# Patient Record
Sex: MALE | Race: OTHER | HISPANIC OR LATINO | Employment: UNEMPLOYED | URBAN - METROPOLITAN AREA
[De-identification: names, ages, dates, MRNs, and addresses within clinical notes are randomized per-mention and may not be internally consistent; named-entity substitution may affect disease eponyms.]

---

## 2017-02-26 ENCOUNTER — HOSPITAL ENCOUNTER (EMERGENCY)
Facility: HOSPITAL | Age: 22
Discharge: HOME | End: 2017-02-27
Attending: EMERGENCY MEDICINE | Admitting: EMERGENCY MEDICINE
Payer: COMMERCIAL

## 2017-02-26 VITALS
HEART RATE: 63 BPM | RESPIRATION RATE: 16 BRPM | WEIGHT: 132.5 LBS | OXYGEN SATURATION: 98 % | DIASTOLIC BLOOD PRESSURE: 69 MMHG | TEMPERATURE: 98.2 F | SYSTOLIC BLOOD PRESSURE: 108 MMHG

## 2017-02-26 DIAGNOSIS — R51.9 HEADACHE: Primary | ICD-10-CM

## 2017-02-26 DIAGNOSIS — R11.10 VOMITING: ICD-10-CM

## 2017-02-27 PROCEDURE — 99283 EMERGENCY DEPT VISIT LOW MDM: CPT

## 2017-02-27 RX ORDER — METOCLOPRAMIDE 10 MG/1
10 TABLET ORAL ONCE
Status: COMPLETED | OUTPATIENT
Start: 2017-02-27 | End: 2017-02-27

## 2017-02-27 RX ORDER — IBUPROFEN 600 MG/1
600 TABLET ORAL ONCE
Status: COMPLETED | OUTPATIENT
Start: 2017-02-27 | End: 2017-02-27

## 2017-02-27 RX ADMIN — METOCLOPRAMIDE 10 MG: 10 TABLET ORAL at 00:58

## 2017-02-27 RX ADMIN — IBUPROFEN 600 MG: 600 TABLET, FILM COATED ORAL at 00:58

## 2017-03-22 ENCOUNTER — HOSPITAL ENCOUNTER (EMERGENCY)
Facility: HOSPITAL | Age: 22
Discharge: HOME | End: 2017-03-22
Attending: EMERGENCY MEDICINE | Admitting: EMERGENCY MEDICINE

## 2017-03-22 VITALS
RESPIRATION RATE: 17 BRPM | SYSTOLIC BLOOD PRESSURE: 101 MMHG | TEMPERATURE: 98.6 F | HEART RATE: 58 BPM | OXYGEN SATURATION: 100 % | DIASTOLIC BLOOD PRESSURE: 63 MMHG | WEIGHT: 132 LBS

## 2017-03-22 DIAGNOSIS — T14.8XXA MUSCLE STRAIN: ICD-10-CM

## 2017-03-22 DIAGNOSIS — M54.50 LOW BACK PAIN: Primary | ICD-10-CM

## 2017-03-22 LAB
BILIRUB UR QL STRIP: NEGATIVE
CLARITY UR: CLEAR
COLOR UR: YELLOW
COLOR, POC: YELLOW
GLUCOSE UR STRIP-MCNC: NEGATIVE MG/DL
HGB UR QL STRIP.AUTO: NEGATIVE
KETONES UR STRIP-MCNC: NEGATIVE MG/DL
LEUKOCYTE ESTERASE UR QL STRIP: NEGATIVE
NITRITE UR QL STRIP: NEGATIVE
PH UR STRIP.AUTO: 6 [PH] (ref 4.5–8)
PROT UR STRIP-MCNC: NEGATIVE MG/DL
SP GR UR STRIP.AUTO: 1.02 (ref 1–1.03)
UROBILINOGEN UR QL STRIP.AUTO: 0.2 E.U./DL

## 2017-03-22 PROCEDURE — 81003 URINALYSIS AUTO W/O SCOPE: CPT

## 2017-03-22 PROCEDURE — 99284 EMERGENCY DEPT VISIT MOD MDM: CPT

## 2017-03-22 PROCEDURE — 81002 URINALYSIS NONAUTO W/O SCOPE: CPT

## 2017-03-22 PROCEDURE — 96372 THER/PROPH/DIAG INJ SC/IM: CPT

## 2017-03-22 RX ORDER — KETOROLAC TROMETHAMINE 30 MG/ML
15 INJECTION, SOLUTION INTRAMUSCULAR; INTRAVENOUS ONCE
Status: COMPLETED | OUTPATIENT
Start: 2017-03-22 | End: 2017-03-22

## 2017-03-22 RX ORDER — NAPROXEN 375 MG/1
375 TABLET ORAL
Qty: 30 TABLET | Refills: 0 | Status: SHIPPED | OUTPATIENT
Start: 2017-03-22 | End: 2019-11-06

## 2017-03-22 RX ADMIN — KETOROLAC TROMETHAMINE 15 MG: 30 INJECTION, SOLUTION INTRAMUSCULAR at 21:51

## 2019-11-06 ENCOUNTER — HOSPITAL ENCOUNTER (INPATIENT)
Facility: HOSPITAL | Age: 24
LOS: 6 days | Discharge: HOME | DRG: 751 | End: 2019-11-13
Attending: PSYCHIATRY & NEUROLOGY | Admitting: PSYCHIATRY & NEUROLOGY
Payer: COMMERCIAL

## 2019-11-06 DIAGNOSIS — F33.2 SEVERE EPISODE OF RECURRENT MAJOR DEPRESSIVE DISORDER, WITHOUT PSYCHOTIC FEATURES (HCC): Primary | Chronic | ICD-10-CM

## 2019-11-06 DIAGNOSIS — F19.10 SUBSTANCE ABUSE (HCC): ICD-10-CM

## 2019-11-06 DIAGNOSIS — R45.851 SUICIDAL IDEATION: ICD-10-CM

## 2019-11-06 DIAGNOSIS — F39 MOOD DISORDER (HCC): ICD-10-CM

## 2019-11-06 DIAGNOSIS — R74.8 ELEVATED LIVER ENZYMES: ICD-10-CM

## 2019-11-06 DIAGNOSIS — F11.20 OPIOID DEPENDENCE ON AGONIST THERAPY (HCC): ICD-10-CM

## 2019-11-06 DIAGNOSIS — F17.200 NICOTINE DEPENDENCE: ICD-10-CM

## 2019-11-06 LAB
ALBUMIN SERPL BCP-MCNC: 4.5 G/DL (ref 3–5.2)
ALP SERPL-CCNC: 45 U/L (ref 43–122)
ALT SERPL W P-5'-P-CCNC: 351 U/L (ref 9–52)
AMPHETAMINES SERPL QL SCN: NEGATIVE
ANION GAP SERPL CALCULATED.3IONS-SCNC: 6 MMOL/L (ref 5–14)
APAP SERPL-MCNC: <10 UG/ML (ref 10–20)
AST SERPL W P-5'-P-CCNC: 120 U/L (ref 17–59)
ATRIAL RATE: 68 BPM
BARBITURATES UR QL: NEGATIVE
BASOPHILS # BLD AUTO: 0.1 THOUSANDS/ΜL (ref 0–0.1)
BASOPHILS NFR BLD AUTO: 1 % (ref 0–1)
BENZODIAZ UR QL: NEGATIVE
BILIRUB SERPL-MCNC: 0.8 MG/DL
BUN SERPL-MCNC: 12 MG/DL (ref 5–25)
CALCIUM SERPL-MCNC: 9.5 MG/DL (ref 8.4–10.2)
CHLORIDE SERPL-SCNC: 102 MMOL/L (ref 97–108)
CO2 SERPL-SCNC: 33 MMOL/L (ref 22–30)
COCAINE UR QL: NEGATIVE
CREAT SERPL-MCNC: 0.91 MG/DL (ref 0.7–1.5)
EOSINOPHIL # BLD AUTO: 0.1 THOUSAND/ΜL (ref 0–0.4)
EOSINOPHIL NFR BLD AUTO: 1 % (ref 0–6)
ERYTHROCYTE [DISTWIDTH] IN BLOOD BY AUTOMATED COUNT: 13.8 %
ETHANOL EXG-MCNC: 0 MG/DL
GFR SERPL CREATININE-BSD FRML MDRD: 118 ML/MIN/1.73SQ M
GLUCOSE SERPL-MCNC: 94 MG/DL (ref 70–99)
HCT VFR BLD AUTO: 40.7 % (ref 41–53)
HGB BLD-MCNC: 13.9 G/DL (ref 13.5–17.5)
LYMPHOCYTES # BLD AUTO: 1.8 THOUSANDS/ΜL (ref 0.5–4)
LYMPHOCYTES NFR BLD AUTO: 16 % (ref 25–45)
MCH RBC QN AUTO: 28.4 PG (ref 26–34)
MCHC RBC AUTO-ENTMCNC: 34.2 G/DL (ref 31–36)
MCV RBC AUTO: 83 FL (ref 80–100)
METHADONE UR QL: NEGATIVE
MONOCYTES # BLD AUTO: 0.8 THOUSAND/ΜL (ref 0.2–0.9)
MONOCYTES NFR BLD AUTO: 7 % (ref 1–10)
NEUTROPHILS # BLD AUTO: 8.8 THOUSANDS/ΜL (ref 1.8–7.8)
NEUTS SEG NFR BLD AUTO: 76 % (ref 45–65)
OPIATES UR QL SCN: POSITIVE
P AXIS: 71 DEGREES
PCP UR QL: NEGATIVE
PLATELET # BLD AUTO: 350 THOUSANDS/UL (ref 150–450)
PMV BLD AUTO: 7.4 FL (ref 8.9–12.7)
POTASSIUM SERPL-SCNC: 4.2 MMOL/L (ref 3.6–5)
PR INTERVAL: 166 MS
PROT SERPL-MCNC: 7.5 G/DL (ref 5.9–8.4)
QRS AXIS: 48 DEGREES
QRSD INTERVAL: 104 MS
QT INTERVAL: 400 MS
QTC INTERVAL: 425 MS
RBC # BLD AUTO: 4.92 MILLION/UL (ref 4.5–5.9)
SALICYLATES SERPL-MCNC: <1 MG/DL (ref 10–30)
SODIUM SERPL-SCNC: 141 MMOL/L (ref 137–147)
T WAVE AXIS: 48 DEGREES
THC UR QL: NEGATIVE
VENTRICULAR RATE: 68 BPM
WBC # BLD AUTO: 11.6 THOUSAND/UL (ref 4.5–11)

## 2019-11-06 PROCEDURE — 93010 ELECTROCARDIOGRAM REPORT: CPT | Performed by: INTERNAL MEDICINE

## 2019-11-06 PROCEDURE — 93005 ELECTROCARDIOGRAM TRACING: CPT

## 2019-11-06 PROCEDURE — 86592 SYPHILIS TEST NON-TREP QUAL: CPT | Performed by: PSYCHIATRY & NEUROLOGY

## 2019-11-06 PROCEDURE — 82075 ASSAY OF BREATH ETHANOL: CPT | Performed by: EMERGENCY MEDICINE

## 2019-11-06 PROCEDURE — 83735 ASSAY OF MAGNESIUM: CPT | Performed by: PSYCHIATRY & NEUROLOGY

## 2019-11-06 PROCEDURE — 99285 EMERGENCY DEPT VISIT HI MDM: CPT | Performed by: EMERGENCY MEDICINE

## 2019-11-06 PROCEDURE — 85025 COMPLETE CBC W/AUTO DIFF WBC: CPT | Performed by: EMERGENCY MEDICINE

## 2019-11-06 PROCEDURE — 80053 COMPREHEN METABOLIC PANEL: CPT | Performed by: EMERGENCY MEDICINE

## 2019-11-06 PROCEDURE — 36415 COLL VENOUS BLD VENIPUNCTURE: CPT | Performed by: EMERGENCY MEDICINE

## 2019-11-06 PROCEDURE — 84443 ASSAY THYROID STIM HORMONE: CPT | Performed by: PSYCHIATRY & NEUROLOGY

## 2019-11-06 PROCEDURE — 80061 LIPID PANEL: CPT | Performed by: PSYCHIATRY & NEUROLOGY

## 2019-11-06 PROCEDURE — 80329 ANALGESICS NON-OPIOID 1 OR 2: CPT | Performed by: EMERGENCY MEDICINE

## 2019-11-06 PROCEDURE — 80307 DRUG TEST PRSMV CHEM ANLYZR: CPT | Performed by: EMERGENCY MEDICINE

## 2019-11-06 PROCEDURE — G0480 DRUG TEST DEF 1-7 CLASSES: HCPCS | Performed by: EMERGENCY MEDICINE

## 2019-11-06 PROCEDURE — 99285 EMERGENCY DEPT VISIT HI MDM: CPT

## 2019-11-06 NOTE — ED NOTES
Insurance Authorization   EVS (Eligibility Verification System) called - [phone]    Automated system indicates: **    Patient is not eligible for Medical Assistance

## 2019-11-06 NOTE — ED NOTES
Patient is a 22 yr old male brought to the ED by his aunt after they went to [facility] asking for treatment  This AM, his aunt went to check on him and found him with a kitchen knife trying to cut his wrist  He has no prior hx of trying to hurt self, but said that he has been hearing voices for the last week  He also has a hx of drug abuse, reports that he  has been using heroin daily, 10 bags IV, for the last year  Three months ago, he was arrested for a DUI, said that he was using ETOH and percocets at the time  THe DUI is not completed, and he did not go for treatment at that time  He is agreeable to dual treatment  He believes that his drug use got worse after the mother of his son kicked him out  He is depressed, withdrawn, but cooperative  He reports that he tried to hurt himself due to the voices telling him  He tried to stop the Heroin last night, but got sick and used today around noon  He is willing to get treatment and signed a 12 ([place])       Patient was referred to the hospital by [facility] but is not a regular client there    He has no hx of rehabs or inpatient treatment      Donnie MONGE

## 2019-11-06 NOTE — ED PROVIDER NOTES
History  Chief Complaint   Patient presents with    Psychiatric Evaluation     Aunt reports when she walked into the house he was laying with a knife laying on his wrist  Patient reports he is tired of using of heroin, snorts 10 bags a day  He wants to get help to get off of the drugs  +SI/ -HI  +AH "The voices tell me to kill myself"  21 yo M with PMH of heroin abuse presents with suicidal ideation  He is sick of using drugs and wants to be done with them, so has been thinking of hurting himself  His aunt came home to find him with a knife to his wrist, but he denies cutting himself  Last used heroin 4 hours PTA, denies any other ingestions  No complaints at this time  Hears voices tellng him to kill himself  No HI  No previous in past        History provided by:  Patient and relative   used: No    Suicidal   Presenting symptoms: hallucinations and suicidal thoughts    Patient accompanied by:  Family member  Onset quality:  Gradual  Timing:  Constant  Progression:  Unchanged  Chronicity:  New  Context: drug abuse    Associated symptoms: no abdominal pain, no anxiety, no chest pain, no fatigue and no headaches        None       Past Medical History:   Diagnosis Date    No known problems        Past Surgical History:   Procedure Laterality Date    NO PAST SURGERIES         History reviewed  No pertinent family history  I have reviewed and agree with the history as documented  Social History     Tobacco Use    Smoking status: Never Smoker    Smokeless tobacco: Current User   Substance Use Topics    Alcohol use: No    Drug use: Yes     Types: Heroin        Review of Systems   Constitutional: Negative for chills, diaphoresis, fatigue, fever and unexpected weight change  HENT: Negative for congestion, ear pain, rhinorrhea, sore throat, trouble swallowing and voice change  Eyes: Negative for pain and visual disturbance     Respiratory: Negative for cough, chest tightness and shortness of breath  Cardiovascular: Negative for chest pain, palpitations and leg swelling  Gastrointestinal: Negative for abdominal pain, blood in stool, constipation, diarrhea, nausea and vomiting  Genitourinary: Negative for difficulty urinating and hematuria  Musculoskeletal: Negative for arthralgias, back pain and neck pain  Skin: Negative for rash  Neurological: Negative for dizziness, syncope, light-headedness and headaches  Psychiatric/Behavioral: Positive for hallucinations and suicidal ideas  Negative for confusion  The patient is not nervous/anxious  Physical Exam  Physical Exam   Constitutional: He is oriented to person, place, and time  He appears well-developed and well-nourished  No distress  HENT:   Head: Normocephalic and atraumatic  Right Ear: External ear normal    Left Ear: External ear normal    Nose: Nose normal    Mouth/Throat: Oropharynx is clear and moist    Eyes: Pupils are equal, round, and reactive to light  Conjunctivae and EOM are normal  Right eye exhibits no discharge  Left eye exhibits no discharge  No scleral icterus  Neck: Normal range of motion  Neck supple  No JVD present  No tracheal deviation present  Cardiovascular: Normal rate, regular rhythm, normal heart sounds and intact distal pulses  Exam reveals no gallop and no friction rub  No murmur heard  Pulmonary/Chest: Effort normal and breath sounds normal  No stridor  No respiratory distress  He has no wheezes  He has no rales  He exhibits no tenderness  Abdominal: Soft  Bowel sounds are normal  He exhibits no distension  There is no tenderness  There is no rebound and no guarding  Musculoskeletal: Normal range of motion  He exhibits no edema, tenderness or deformity  Lymphadenopathy:     He has no cervical adenopathy  Neurological: He is alert and oriented to person, place, and time  No cranial nerve deficit or sensory deficit  Coordination normal    Skin: Skin is warm and dry   No rash noted  He is not diaphoretic  Psychiatric: He has a normal mood and affect  His speech is normal  Judgment normal  He is withdrawn  Cognition and memory are normal  He expresses suicidal ideation  He expresses suicidal plans  He expresses no homicidal plans  Nursing note and vitals reviewed  Vital Signs  ED Triage Vitals   Temperature Pulse Respirations Blood Pressure SpO2   11/06/19 1500 11/06/19 1500 11/06/19 1500 11/06/19 1500 11/06/19 1500   (!) 96 °F (35 6 °C) 80 16 101/61 97 %      Temp Source Heart Rate Source Patient Position - Orthostatic VS BP Location FiO2 (%)   11/06/19 1500 11/06/19 1736 11/06/19 1500 11/06/19 1500 --   Tympanic Monitor Sitting Left arm       Pain Score       --                  Vitals:    11/06/19 1500 11/06/19 1736   BP: 101/61 97/56   Pulse: 80 58   Patient Position - Orthostatic VS: Sitting Lying         Visual Acuity      ED Medications  Medications - No data to display    Diagnostic Studies  Results Reviewed     Procedure Component Value Units Date/Time    Rapid drug screen, urine [[id number]]  (Abnormal) Collected:  11/06/19 1519    Lab Status:  Final result Specimen:  Urine, Other Updated:  11/06/19 1555     Amph/Meth UR Negative     Barbiturate Ur Negative     Benzodiazepine Urine Negative     Cocaine Urine Negative     Methadone Urine Negative     Opiate Urine Positive     PCP Ur Negative     THC Urine Negative    Narrative:       Presumptive report  If requested, specimen will be sent to reference lab for confirmation  FOR MEDICAL PURPOSES ONLY  IF CONFIRMATION NEEDED PLEASE CONTACT THE LAB WITHIN 5 DAYS      Drug Screen Cutoff Levels:  AMPHETAMINE/METHAMPHETAMINES  1000 ng/mL  BARBITURATES     200 ng/mL  BENZODIAZEPINES     200 ng/mL  COCAINE      300 ng/mL  METHADONE      300 ng/mL  OPIATES      300 ng/mL  PHENCYCLIDINE     25 ng/mL  THC       50 ng/mL      Acetaminophen level-"If concentration is detectable, please discuss with medical  on call " [[id number]]  (Abnormal) Collected:  11/06/19 1520    Lab Status:  Final result Specimen:  Blood from Arm, Left Updated:  11/06/19 1552     Acetaminophen Level <21 ug/mL     Salicylate level [[id number]]  (Abnormal) Collected:  11/06/19 1520    Lab Status:  Final result Specimen:  Blood from Arm, Left Updated:  76/35/42 3451     Salicylate Lvl <1 0 mg/dL     Comprehensive metabolic panel [[id number]]  (Abnormal) Collected:  11/06/19 1520    Lab Status:  Final result Specimen:  Blood from Arm, Left Updated:  11/06/19 1551     Sodium 141 mmol/L      Potassium 4 2 mmol/L      Chloride 102 mmol/L      CO2 33 mmol/L      ANION GAP 6 mmol/L      BUN 12 mg/dL      Creatinine 0 91 mg/dL      Glucose 94 mg/dL      Calcium 9 5 mg/dL       U/L       U/L      Alkaline Phosphatase 45 U/L      Total Protein 7 5 g/dL      Albumin 4 5 g/dL      Total Bilirubin 0 80 mg/dL      eGFR 118 ml/min/1 73sq m     Narrative:       [facility] guidelines for Chronic Kidney Disease (CKD):     Stage 1 with normal or high GFR (GFR > 90 mL/min/1 73 square meters)    Stage 2 Mild CKD (GFR = 60-89 mL/min/1 73 square meters)    Stage 3A Moderate CKD (GFR = 45-59 mL/min/1 73 square meters)    Stage 3B Moderate CKD (GFR = 30-44 mL/min/1 73 square meters)    Stage 4 Severe CKD (GFR = 15-29 mL/min/1 73 square meters)    Stage 5 End Stage CKD (GFR <15 mL/min/1 73 square meters)  Note: GFR calculation is accurate only with a steady state creatinine    CBC and differential [[id number]]  (Abnormal) Collected:  11/06/19 1520    Lab Status:  Final result Specimen:  Blood from Arm, Left Updated:  11/06/19 1537     WBC 11 60 Thousand/uL      RBC 4 92 Million/uL      Hemoglobin 13 9 g/dL      Hematocrit 40 7 %      MCV 83 fL      MCH 28 4 pg      MCHC 34 2 g/dL      RDW 13 8 %      MPV 7 4 fL      Platelets 156 Thousands/uL      Neutrophils Relative 76 %      Lymphocytes Relative 16 %      Monocytes Relative 7 % Eosinophils Relative 1 %      Basophils Relative 1 %      Neutrophils Absolute 8 80 Thousands/µL      Lymphocytes Absolute 1 80 Thousands/µL      Monocytes Absolute 0 80 Thousand/µL      Eosinophils Absolute 0 10 Thousand/µL      Basophils Absolute 0 10 Thousands/µL     POCT alcohol breath test [[id number]]  (Normal) Resulted:  11/06/19 1517    Lab Status:  Final result Updated:  11/06/19 1517     EXTBreath Alcohol 0 000                 No orders to display              Procedures  ECG 12 Lead Documentation Only  Date/Time: 11/6/2019 3:31 PM  Performed by: Katrin Villafuerte MD  Authorized by: Katrin Villafuerte MD     Indications / Diagnosis:  Suicidal  ECG reviewed by me, the ED Provider: yes    Patient location:  ED  Previous ECG:     Previous ECG:  Unavailable    Comparison to cardiac monitor: No    Interpretation:     Interpretation: abnormal    Rate:     ECG rate:  68    ECG rate assessment: normal    Rhythm:     Rhythm: sinus rhythm    Ectopy:     Ectopy: none    QRS:     QRS axis:  Normal    QRS intervals:  Normal  ST segments:     ST segments:  Non-specific  T waves:     T waves: normal    Comments:      RSR' - from EMR, appears to have had in past             ED Course  ED Course as of Nov 06 1929   Wed Nov 06, 2019   1611 Pt medically cleared and stable for [facility] evaluation  1738 201 signed  Awaiting bed placement  1929 Signed out to Dr Eduardo Zapata  Still awaiting bed placement                                    MDM  Number of Diagnoses or Management Options  Substance abuse ([facility] [address] ): new and requires workup  Suicidal ideation: new and requires workup     Amount and/or Complexity of Data Reviewed  Clinical lab tests: ordered and reviewed  Tests in the medicine section of CPT®: ordered and reviewed  Discuss the patient with other providers: yes    Risk of Complications, Morbidity, and/or Mortality  Presenting problems: moderate  Diagnostic procedures: low  Management options: low    Patient Progress  Patient progress: stable      Disposition  Final diagnoses:   Suicidal ideation   Substance abuse ([facility] [address] )     Time reflects when diagnosis was documented in both MDM as applicable and the Disposition within this note     Time User Action Codes Description Comment    11/6/2019  5:37 PM Carry Earing Add [S68 903] Suicidal ideation     11/6/2019  5:37 PM Carry Earing Add [F19 10] Substance abuse [facility])       ED Disposition     None      MD Documentation      Most Recent Value   Sending MD Dr Felicita Patel    None         Patient's Medications   Discharge Prescriptions    No medications on file     No discharge procedures on file      ED Provider  Electronically Signed by           Zulema Alexander MD  11/06/19 2892

## 2019-11-07 LAB
BACTERIA UR QL AUTO: ABNORMAL /HPF
BILIRUB UR QL STRIP: NEGATIVE
CHOLEST SERPL-MCNC: 155 MG/DL
CLARITY UR: CLEAR
COLOR UR: ABNORMAL
GLUCOSE UR STRIP-MCNC: NEGATIVE MG/DL
HDLC SERPL-MCNC: 29 MG/DL
HGB UR QL STRIP.AUTO: 10
KETONES UR STRIP-MCNC: NEGATIVE MG/DL
LDLC SERPL CALC-MCNC: 112 MG/DL
LEUKOCYTE ESTERASE UR QL STRIP: 100
MAGNESIUM SERPL-MCNC: 2 MG/DL (ref 1.6–2.3)
NITRITE UR QL STRIP: NEGATIVE
NON-SQ EPI CELLS URNS QL MICRO: ABNORMAL /HPF
NONHDLC SERPL-MCNC: 126 MG/DL
PH UR STRIP.AUTO: 6 [PH]
PROT UR STRIP-MCNC: NEGATIVE MG/DL
RBC #/AREA URNS AUTO: ABNORMAL /HPF
SP GR UR STRIP.AUTO: 1.01 (ref 1–1.04)
TRIGL SERPL-MCNC: 72 MG/DL
TSH SERPL DL<=0.05 MIU/L-ACNC: 3.67 UIU/ML (ref 0.47–4.68)
UROBILINOGEN UA: NEGATIVE MG/DL
WBC #/AREA URNS AUTO: ABNORMAL /HPF

## 2019-11-07 PROCEDURE — 81001 URINALYSIS AUTO W/SCOPE: CPT | Performed by: PSYCHIATRY & NEUROLOGY

## 2019-11-07 RX ORDER — RISPERIDONE 1 MG/1
1 TABLET, ORALLY DISINTEGRATING ORAL
Status: DISCONTINUED | OUTPATIENT
Start: 2019-11-07 | End: 2019-11-13 | Stop reason: HOSPADM

## 2019-11-07 RX ORDER — CLONIDINE HYDROCHLORIDE 0.1 MG/1
0.1 TABLET ORAL EVERY 6 HOURS PRN
Status: DISCONTINUED | OUTPATIENT
Start: 2019-11-07 | End: 2019-11-13 | Stop reason: HOSPADM

## 2019-11-07 RX ORDER — LORAZEPAM 1 MG/1
1 TABLET ORAL EVERY 8 HOURS PRN
Status: DISCONTINUED | OUTPATIENT
Start: 2019-11-07 | End: 2019-11-13 | Stop reason: HOSPADM

## 2019-11-07 RX ORDER — BENZTROPINE MESYLATE 1 MG/ML
1 INJECTION INTRAMUSCULAR; INTRAVENOUS EVERY 6 HOURS PRN
Status: DISCONTINUED | OUTPATIENT
Start: 2019-11-07 | End: 2019-11-13 | Stop reason: HOSPADM

## 2019-11-07 RX ORDER — CLONIDINE HYDROCHLORIDE 0.1 MG/1
0.05 TABLET ORAL 4 TIMES DAILY
Status: DISCONTINUED | OUTPATIENT
Start: 2019-11-07 | End: 2019-11-11

## 2019-11-07 RX ORDER — HALOPERIDOL 5 MG
5 TABLET ORAL EVERY 8 HOURS PRN
Status: DISCONTINUED | OUTPATIENT
Start: 2019-11-07 | End: 2019-11-13 | Stop reason: HOSPADM

## 2019-11-07 RX ORDER — OLANZAPINE 10 MG/1
10 INJECTION, POWDER, LYOPHILIZED, FOR SOLUTION INTRAMUSCULAR
Status: DISCONTINUED | OUTPATIENT
Start: 2019-11-07 | End: 2019-11-13 | Stop reason: HOSPADM

## 2019-11-07 RX ORDER — TRAZODONE HYDROCHLORIDE 50 MG/1
50 TABLET ORAL
Status: DISCONTINUED | OUTPATIENT
Start: 2019-11-07 | End: 2019-11-13 | Stop reason: HOSPADM

## 2019-11-07 RX ORDER — IBUPROFEN 400 MG/1
400 TABLET ORAL EVERY 8 HOURS PRN
Status: DISCONTINUED | OUTPATIENT
Start: 2019-11-07 | End: 2019-11-13 | Stop reason: HOSPADM

## 2019-11-07 RX ORDER — MAGNESIUM HYDROXIDE/ALUMINUM HYDROXICE/SIMETHICONE 120; 1200; 1200 MG/30ML; MG/30ML; MG/30ML
30 SUSPENSION ORAL EVERY 4 HOURS PRN
Status: DISCONTINUED | OUTPATIENT
Start: 2019-11-07 | End: 2019-11-13 | Stop reason: HOSPADM

## 2019-11-07 RX ORDER — BENZTROPINE MESYLATE 1 MG/1
1 TABLET ORAL EVERY 6 HOURS PRN
Status: DISCONTINUED | OUTPATIENT
Start: 2019-11-07 | End: 2019-11-13 | Stop reason: HOSPADM

## 2019-11-07 RX ORDER — IBUPROFEN 600 MG/1
600 TABLET ORAL EVERY 8 HOURS PRN
Status: DISCONTINUED | OUTPATIENT
Start: 2019-11-07 | End: 2019-11-13 | Stop reason: HOSPADM

## 2019-11-07 RX ORDER — LOPERAMIDE HYDROCHLORIDE 2 MG/1
2 CAPSULE ORAL EVERY 4 HOURS PRN
Status: DISCONTINUED | OUTPATIENT
Start: 2019-11-07 | End: 2019-11-13 | Stop reason: HOSPADM

## 2019-11-07 RX ORDER — HYDROXYZINE HYDROCHLORIDE 25 MG/1
25 TABLET, FILM COATED ORAL EVERY 6 HOURS PRN
Status: DISCONTINUED | OUTPATIENT
Start: 2019-11-07 | End: 2019-11-13 | Stop reason: HOSPADM

## 2019-11-07 RX ORDER — LORAZEPAM 2 MG/ML
1 INJECTION INTRAMUSCULAR EVERY 6 HOURS PRN
Status: DISCONTINUED | OUTPATIENT
Start: 2019-11-07 | End: 2019-11-13 | Stop reason: HOSPADM

## 2019-11-07 RX ORDER — OLANZAPINE 5 MG/1
5 TABLET ORAL
Status: DISCONTINUED | OUTPATIENT
Start: 2019-11-07 | End: 2019-11-13 | Stop reason: HOSPADM

## 2019-11-07 RX ORDER — IBUPROFEN 400 MG/1
800 TABLET ORAL EVERY 8 HOURS PRN
Status: DISCONTINUED | OUTPATIENT
Start: 2019-11-07 | End: 2019-11-13 | Stop reason: HOSPADM

## 2019-11-07 RX ORDER — METHOCARBAMOL 500 MG/1
500 TABLET, FILM COATED ORAL EVERY 6 HOURS PRN
Status: DISCONTINUED | OUTPATIENT
Start: 2019-11-07 | End: 2019-11-13 | Stop reason: HOSPADM

## 2019-11-07 RX ORDER — ACETAMINOPHEN 325 MG/1
975 TABLET ORAL ONCE
Status: COMPLETED | OUTPATIENT
Start: 2019-11-07 | End: 2019-11-07

## 2019-11-07 RX ORDER — HALOPERIDOL 5 MG/ML
5 INJECTION INTRAMUSCULAR EVERY 6 HOURS PRN
Status: DISCONTINUED | OUTPATIENT
Start: 2019-11-07 | End: 2019-11-13 | Stop reason: HOSPADM

## 2019-11-07 RX ORDER — ONDANSETRON 4 MG/1
4 TABLET, ORALLY DISINTEGRATING ORAL EVERY 6 HOURS PRN
Status: DISCONTINUED | OUTPATIENT
Start: 2019-11-07 | End: 2019-11-13 | Stop reason: HOSPADM

## 2019-11-07 RX ORDER — DICYCLOMINE HYDROCHLORIDE 10 MG/1
10 CAPSULE ORAL EVERY 6 HOURS PRN
Status: DISCONTINUED | OUTPATIENT
Start: 2019-11-07 | End: 2019-11-13 | Stop reason: HOSPADM

## 2019-11-07 RX ADMIN — CLONIDINE HYDROCHLORIDE 0.05 MG: 0.1 TABLET ORAL at 17:53

## 2019-11-07 RX ADMIN — NICOTINE POLACRILEX 2 MG: 2 GUM, CHEWING ORAL at 20:51

## 2019-11-07 RX ADMIN — CLONIDINE HYDROCHLORIDE 0.05 MG: 0.1 TABLET ORAL at 21:14

## 2019-11-07 RX ADMIN — ACETAMINOPHEN 975 MG: 325 TABLET ORAL at 08:20

## 2019-11-07 RX ADMIN — LORAZEPAM 1 MG: 1 TABLET ORAL at 16:10

## 2019-11-07 NOTE — PLAN OF CARE
Care plan initiated     Problem: SELF HARM/SUICIDALITY  Goal: Will have no self-injury during hospital stay  Description  INTERVENTIONS:  - Q 15 MINUTES: Routine safety checks  - Q WAKING SHIFT & PRN: Assess risk to determine if routine checks are adequate to maintain patient safety  - Encourage patient to participate actively in care by formulating a plan to combat response to suicidal ideation, identify supports and resources  Outcome: Progressing     Problem: DEPRESSION  Goal: Will be euthymic at discharge  Description  INTERVENTIONS:  - Administer medication as ordered  - Provide emotional support via 1:1 interaction with staff  - Encourage involvement in milieu/groups/activities  - Monitor for social isolation  Outcome: Progressing     Problem: SUBSTANCE USE/ABUSE  Goal: Will have no detox symptoms and will verbalize plan for changing substance-related behavior  Description  INTERVENTIONS:  - Monitor physical status and assess for symptoms of withdrawal  - Administer medication as ordered  - Provide emotional support with 1 on 1 interaction with staff  - Encourage recovery focused program/ addiction education  - Assess for verbalization of changing behaviors related to substance abuse  - Initiate consults and referrals as appropriate (Case Management, Spiritual Care, etc )  Outcome: Progressing  Goal: By discharge, will develop insight into their chemical dependency and sustain motivation to continue in recovery  Description  INTERVENTIONS:  - Attends all daily group sessions and scheduled AA groups  - Actively practices coping skills through participation in the therapeutic community and adherence to program rules  - Reviews and completes assignments from individual treatment plan  - Assist patient development of understanding of their personal cycle of addiction and relapse triggers  Outcome: Progressing  Goal: By discharge, patient will have ongoing treatment plan addressing chemical dependency  Description  INTERVENTIONS:  - Assist patient with resources and/or appointments for ongoing recovery based living  Outcome: Progressing

## 2019-11-07 NOTE — ED NOTES
Per promise, patient is not on file, and has no other insurance on chart  Currently there are no beds in network  [place] has no beds ([facility] included), [facility] [facility] also have no [facility] bed available  Patient will be an in network search in the morning  201 faxed to intake for morning review

## 2019-11-07 NOTE — ED NOTES
Patient is accepted at [facility]  Patient is accepted by Natasha Narvaez  Patient may go to the floor at D  Nurse report is to be called to  prior to patient transfer

## 2019-11-07 NOTE — PROGRESS NOTES
Admission note:  Patient is a 22 y o   male [facility]  Patient is German speaking  Patient came in because he was having SI to cut his wrist with a knife  Patient has started using heroin and he states his drug use has gotten out of hand the last 3 months  Patient reports last using heroin yesterday  Patient reports he does struggle from depression and anxiety but this is the first time he has ever made an attempt  Patient also reports hearing voices telling him to kill himself and also seeing things that are not there  Patient states Heroin helps with the hallucinations  Patient states he just wants to get clean but he cannot live like this anymore  Patient was tearful during admission assessment  Patient states he also has a son and he was to get clean for him  Patient denies any significant medical hx, patient also has NKDA  Patient's UDS was positive for Opiates  Patient denies alcohol and nicotine use

## 2019-11-07 NOTE — ED NOTES
Patient transported to floor via wheel chair with Theola Shone (tech) and Jamaica Corrales ([facility])        [facility]  11/07/19 3802

## 2019-11-08 PROBLEM — Z00.8 MEDICAL CLEARANCE FOR PSYCHIATRIC ADMISSION: Status: ACTIVE | Noted: 2019-11-08

## 2019-11-08 PROBLEM — F33.2 SEVERE EPISODE OF RECURRENT MAJOR DEPRESSIVE DISORDER, WITHOUT PSYCHOTIC FEATURES (HCC): Chronic | Status: ACTIVE | Noted: 2019-11-08

## 2019-11-08 PROBLEM — R94.31 PROLONGED Q-T INTERVAL ON ECG: Status: ACTIVE | Noted: 2019-11-08

## 2019-11-08 PROBLEM — R74.8 ELEVATED LIVER ENZYMES: Status: ACTIVE | Noted: 2019-11-08

## 2019-11-08 LAB
ALBUMIN SERPL BCP-MCNC: 4.1 G/DL (ref 3–5.2)
ALP SERPL-CCNC: 39 U/L (ref 43–122)
ALT SERPL W P-5'-P-CCNC: 244 U/L (ref 9–52)
ANION GAP SERPL CALCULATED.3IONS-SCNC: 9 MMOL/L (ref 5–14)
AST SERPL W P-5'-P-CCNC: 71 U/L (ref 17–59)
BASOPHILS # BLD AUTO: 0.1 THOUSANDS/ΜL (ref 0–0.1)
BASOPHILS NFR BLD AUTO: 1 % (ref 0–1)
BILIRUB SERPL-MCNC: 0.6 MG/DL
BUN SERPL-MCNC: 12 MG/DL (ref 5–25)
CALCIUM SERPL-MCNC: 9.2 MG/DL (ref 8.4–10.2)
CHLORIDE SERPL-SCNC: 102 MMOL/L (ref 97–108)
CO2 SERPL-SCNC: 29 MMOL/L (ref 22–30)
CREAT SERPL-MCNC: 0.77 MG/DL (ref 0.7–1.5)
EOSINOPHIL # BLD AUTO: 0.2 THOUSAND/ΜL (ref 0–0.4)
EOSINOPHIL NFR BLD AUTO: 2 % (ref 0–6)
ERYTHROCYTE [DISTWIDTH] IN BLOOD BY AUTOMATED COUNT: 13.5 %
GFR SERPL CREATININE-BSD FRML MDRD: 128 ML/MIN/1.73SQ M
GLUCOSE P FAST SERPL-MCNC: 101 MG/DL (ref 70–99)
GLUCOSE SERPL-MCNC: 101 MG/DL (ref 70–99)
HAV IGM SER QL: NORMAL
HBV CORE IGM SER QL: NORMAL
HBV SURFACE AG SER QL: NORMAL
HCT VFR BLD AUTO: 42.1 % (ref 41–53)
HCV AB SER QL: NORMAL
HGB BLD-MCNC: 14.3 G/DL (ref 13.5–17.5)
LYMPHOCYTES # BLD AUTO: 2.3 THOUSANDS/ΜL (ref 0.5–4)
LYMPHOCYTES NFR BLD AUTO: 22 % (ref 25–45)
MCH RBC QN AUTO: 28.4 PG (ref 26–34)
MCHC RBC AUTO-ENTMCNC: 34 G/DL (ref 31–36)
MCV RBC AUTO: 84 FL (ref 80–100)
MONOCYTES # BLD AUTO: 0.8 THOUSAND/ΜL (ref 0.2–0.9)
MONOCYTES NFR BLD AUTO: 8 % (ref 1–10)
NEUTROPHILS # BLD AUTO: 7.3 THOUSANDS/ΜL (ref 1.8–7.8)
NEUTS SEG NFR BLD AUTO: 68 % (ref 45–65)
PLATELET # BLD AUTO: 359 THOUSANDS/UL (ref 150–450)
PMV BLD AUTO: 8.1 FL (ref 8.9–12.7)
POTASSIUM SERPL-SCNC: 4.1 MMOL/L (ref 3.6–5)
PROT SERPL-MCNC: 7.2 G/DL (ref 5.9–8.4)
RBC # BLD AUTO: 5.03 MILLION/UL (ref 4.5–5.9)
RPR SER QL: NORMAL
SODIUM SERPL-SCNC: 140 MMOL/L (ref 137–147)
WBC # BLD AUTO: 10.7 THOUSAND/UL (ref 4.5–11)

## 2019-11-08 PROCEDURE — 80053 COMPREHEN METABOLIC PANEL: CPT | Performed by: PSYCHIATRY & NEUROLOGY

## 2019-11-08 PROCEDURE — 99222 1ST HOSP IP/OBS MODERATE 55: CPT | Performed by: PSYCHIATRY & NEUROLOGY

## 2019-11-08 PROCEDURE — 85025 COMPLETE CBC W/AUTO DIFF WBC: CPT | Performed by: PSYCHIATRY & NEUROLOGY

## 2019-11-08 PROCEDURE — 80074 ACUTE HEPATITIS PANEL: CPT | Performed by: PSYCHIATRY & NEUROLOGY

## 2019-11-08 PROCEDURE — 99232 SBSQ HOSP IP/OBS MODERATE 35: CPT | Performed by: NURSE PRACTITIONER

## 2019-11-08 RX ORDER — BUPRENORPHINE AND NALOXONE 2; .5 MG/1; MG/1
1 FILM, SOLUBLE BUCCAL; SUBLINGUAL EVERY 4 HOURS PRN
Status: DISCONTINUED | OUTPATIENT
Start: 2019-11-08 | End: 2019-11-13 | Stop reason: HOSPADM

## 2019-11-08 RX ORDER — BUPRENORPHINE AND NALOXONE 2; .5 MG/1; MG/1
2 FILM, SOLUBLE BUCCAL; SUBLINGUAL DAILY
Status: DISCONTINUED | OUTPATIENT
Start: 2019-11-09 | End: 2019-11-13 | Stop reason: HOSPADM

## 2019-11-08 RX ORDER — MIRTAZAPINE 7.5 MG/1
7.5 TABLET, FILM COATED ORAL
Status: DISCONTINUED | OUTPATIENT
Start: 2019-11-08 | End: 2019-11-08

## 2019-11-08 RX ORDER — MIRTAZAPINE 15 MG/1
15 TABLET, FILM COATED ORAL
Status: DISCONTINUED | OUTPATIENT
Start: 2019-11-09 | End: 2019-11-13 | Stop reason: HOSPADM

## 2019-11-08 RX ORDER — BUPRENORPHINE AND NALOXONE 2; .5 MG/1; MG/1
1 FILM, SOLUBLE BUCCAL; SUBLINGUAL EVERY 8 HOURS PRN
Status: DISCONTINUED | OUTPATIENT
Start: 2019-11-08 | End: 2019-11-08

## 2019-11-08 RX ADMIN — CLONIDINE HYDROCHLORIDE 0.05 MG: 0.1 TABLET ORAL at 08:42

## 2019-11-08 RX ADMIN — MIRTAZAPINE 7.5 MG: 7.5 TABLET, FILM COATED ORAL at 21:04

## 2019-11-08 RX ADMIN — IBUPROFEN 600 MG: 600 TABLET ORAL at 13:15

## 2019-11-08 RX ADMIN — HYDROXYZINE HYDROCHLORIDE 25 MG: 25 TABLET ORAL at 13:15

## 2019-11-08 RX ADMIN — IBUPROFEN 800 MG: 400 TABLET ORAL at 21:06

## 2019-11-08 RX ADMIN — CLONIDINE HYDROCHLORIDE 0.05 MG: 0.1 TABLET ORAL at 17:16

## 2019-11-08 RX ADMIN — NICOTINE POLACRILEX 2 MG: 2 GUM, CHEWING ORAL at 15:10

## 2019-11-08 RX ADMIN — CLONIDINE HYDROCHLORIDE 0.05 MG: 0.1 TABLET ORAL at 21:04

## 2019-11-08 NOTE — PROGRESS NOTES
11/08/19 0924   Team Meeting   Meeting Type Tx Team Meeting   Initial Conference Date 11/08/19   Team Members Present   Team Members Present Physician;Nurse;   Physician Team Member Dr Strickland [employer] Team Member Rashmi Oliveros Come   Patient/Family Present   Patient Present Yes   Patient's Family Present No   Pt seen for tx team meeting  Language line used  Pt introduced to tx team  Pt explained he came to the hospital due to depression, SI and substance abuse  Pt denied any hx of rehab, methadone or suboxone  Pt educated on tx that will be offered on the unit  Pt denies any thoughts of harming self at this time  Pt encouraged to stay motivated as it will take a few days to feel better  Tx plan reviewed and signed       Strengths: good communication skills, capable of independent living and in agreement to tx  Barriers: limited support system, chronic substance abuse and poor insight

## 2019-11-08 NOTE — SOCIAL WORK
Worker completed biopsychosocial assessment utilizing the language line with interpretor [id number]  Pt's mood depressed and affect flat  Pt's thought process/content appropriate/organized  Pt's speech normal rate and rhythm, eye contact appropriate and appearance well-groomed  Pt reports coming to the hospital due to increased depression and thoughts of wanting to hurt himself  Pt explained he is tired of the life he is living and wants to stop using heroin  Pt's UDS + for opiates  Pt reports using heroin daily  Pt reports snorting 10 bags/daily  Pt reports using for the past year  Pt is interested in outpatient D/A services  Pt denies any other substance use  Pt denies TOB use  Pt has not hx of D/A tx  Pt denies hx of abuse  Pt reports his grandfather passed away about 1 year ago  Pt reports he currently has two pending charges  Pt reports a court date on 11/13 for robbery and a court date on 11/22 for a DUI  Pt reports this is his first inpatient psychiatric hospitalization  Pt denies hx of SA  Pt with no current outpatient services but is interested  Pt is currently legally , but him and his wife are no longer together  Pt has one son who is 3years old and lives with child's mother  Pt lives with his aunt  Pt's father not much involved in his life  Pt has contact with his mother  Pt has 10th grade education  Pt drives      Pt signed WOLF for Bette Gee (mother) and outpatient referral

## 2019-11-08 NOTE — PROGRESS NOTES
11/08/19 0902   Team Meeting   Meeting Type Daily Rounds   Initial Conference Date 11/08/19   Team Members Present   Team Members Present Physician;Nurse;   Physician Team Member Dr  [address] Team Member Samaria Dixno   Care Management Team Member Victor Hugo Walsh   Patient/Family Present   Patient Present No   Patient's Family Present No   Pt new 201 admit  Pt with heroin abuse and AH to kill self  Pt Turkish-speaking only

## 2019-11-08 NOTE — PROGRESS NOTES
Pt visible on unite, Social with Faroese peers , pt denies anxiety, depression , Si,HI   Will continue to monitor

## 2019-11-08 NOTE — CONSULTS
Consult- Zuhair Nicholas 1995, 22 y o  male MRN: [id number]    Unit/Bed#: Brian Felix 382-01 Encounter: [id number]    Primary Care Provider: No primary care provider on file  Date and time admitted to hospital: 11/6/2019  2:57 PM      Inpatient consult for Medical Clearance for [facility] patient  Consult performed by: Radhames Flores  Consult ordered by: Hernando Bernard MD          Elevated liver enzymes  Assessment & Plan  Patients   AST 71  Continue to trend, if consistent elevation further work up warranted   Repeats CMP ordered    Prolonged Q-T interval on ECG  Assessment & Plan  EKG NSS,   Avoid QT prolonging medications    Medical clearance for psychiatric admission  Assessment & Plan  Patient cleared for psychiatric admission  EKG reviewed  We will now sign off please feel free to call if further assistance is needed form internal medicine  * Severe episode of recurrent major depressive disorder, without psychotic features ([facility] [address] )  Assessment & Plan  As per psy  VTE Prophylaxis: Patient ambulatory, encouraged ambulation    Recommendations for Discharge:  · As per treatment team    Counseling / Coordination of Care Time: 30 minutes  Greater than 50% of total time spent on patient counseling and coordination of care  History of Present Illness:    Zuhair Nicholas is a 22 y o  male who is originally admitted to the psychiatry service on 11/6/2019 as a 201  Patient is Italian speaking  Patient came in because he was having SI to cut his wrist with a knife  Patient has started using heroin and he states his drug use has gotten out of hand the last 3 months  Patient reports last using heroin yesterday  Patient reports he does struggle from depression and anxiety but this is the first time he has ever made an attempt  Patient states he just wants to get clean but he cannot live like this anymore  Patient denies any significant medical hx, patient also has NKDA  Patient's UDS was positive for Opiates  Patient denies alcohol and nicotine use  We are consulted for medical clearance  Review of Systems:    Review of Systems   Constitutional: Negative for chills and fatigue  Respiratory: Negative for chest tightness, shortness of breath and wheezing  Cardiovascular: Negative for chest pain, palpitations and leg swelling  Gastrointestinal: Negative for blood in stool, constipation, diarrhea, nausea and vomiting  Genitourinary: Negative for dysuria and hematuria  Neurological: Negative for dizziness, numbness and headaches  Past Medical and Surgical History:     Past Medical History:   Diagnosis Date    Addiction to drug ([address] )     No known problems        Past Surgical History:   Procedure Laterality Date    NO PAST SURGERIES         Meds/Allergies:    all medications and allergies reviewed    Allergies: No Known Allergies    Social History:     Marital Status: /Civil Union    Substance Use History:   Social History     Substance and Sexual Activity   Alcohol Use Not Currently     Social History     Tobacco Use   Smoking Status Never Smoker   Smokeless Tobacco Current User     Social History     Substance and Sexual Activity   Drug Use Yes    Types: Heroin, Prescription       Family History:    Family History   Problem Relation Age of Onset    No Known Problems Mother        Physical Exam:     Vitals:   Blood Pressure: 124/76 (11/08/19 1520)  Pulse: 82 (11/08/19 1300)  Temperature: 98 1 °F (36 7 °C) (11/08/19 1520)  Temp Source: Temporal (11/08/19 1520)  Respirations: (!) 70 (11/08/19 1520)  Height: 5' 8" (172 7 cm) (11/07/19 1608)  Weight - Scale: 59 9 kg (132 lb) (11/07/19 1608)  SpO2: 99 % (11/07/19 1608)    Physical Exam   Constitutional: He is oriented to person, place, and time  He appears well-developed and well-nourished  No distress  HENT:   Head: Normocephalic and atraumatic     Right Ear: External ear normal    Left Ear: External ear normal    Nose: Nose normal    Mouth/Throat: Oropharynx is clear and moist  No oropharyngeal exudate  Eyes: Pupils are equal, round, and reactive to light  Conjunctivae and EOM are normal  Right eye exhibits no discharge  Left eye exhibits no discharge  Neck: Normal range of motion  Neck supple  No thyromegaly present  Cardiovascular: Normal rate, regular rhythm, normal heart sounds and intact distal pulses  Exam reveals no gallop and no friction rub  No murmur heard  Pulmonary/Chest: Effort normal and breath sounds normal  No stridor  No respiratory distress  He has no wheezes  He has no rales  Abdominal: Soft  Bowel sounds are normal  He exhibits no distension  There is no tenderness  Lymphadenopathy:     He has no cervical adenopathy  Neurological: He is alert and oriented to person, place, and time  Skin: Skin is warm and dry  He is not diaphoretic  Additional Data:     Lab Results: I have personally reviewed pertinent reports  Results from last 7 days   Lab Units 11/08/19  0633   WBC Thousand/uL 10 70   HEMOGLOBIN g/dL 14 3   HEMATOCRIT % 42 1   PLATELETS Thousands/uL 359   NEUTROS PCT % 68*   LYMPHS PCT % 22*   MONOS PCT % 8   EOS PCT % 2     Results from last 7 days   Lab Units 11/08/19  0633   POTASSIUM mmol/L 4 1   CHLORIDE mmol/L 102   CO2 mmol/L 29   BUN mg/dL 12   CREATININE mg/dL 0 77   CALCIUM mg/dL 9 2   ALK PHOS U/L 39*   ALT U/L 244*   AST U/L 71*           EKG, Pathology, and Other Studies Reviewed on Admission:   · EKG: NSS,  read by Dr Jazmin Eagle was used to dictate this note  It may contain errors with dictating incorrect words or incorrect spelling  Please contact the provider directly with any questions

## 2019-11-08 NOTE — PLAN OF CARE
Problem: SELF HARM/SUICIDALITY  Goal: Will have no self-injury during hospital stay  Description  INTERVENTIONS:  - Q 15 MINUTES: Routine safety checks  - Q WAKING SHIFT & PRN: Assess risk to determine if routine checks are adequate to maintain patient safety  - Encourage patient to participate actively in care by formulating a plan to combat response to suicidal ideation, identify supports and resources  Outcome: Progressing     Problem: DEPRESSION  Goal: Will be euthymic at discharge  Description  INTERVENTIONS:  - Administer medication as ordered  - Provide emotional support via 1:1 interaction with staff  - Encourage involvement in milieu/groups/activities  - Monitor for social isolation  Outcome: Progressing     Problem: SUBSTANCE USE/ABUSE  Goal: Will have no detox symptoms and will verbalize plan for changing substance-related behavior  Description  INTERVENTIONS:  - Monitor physical status and assess for symptoms of withdrawal  - Administer medication as ordered  - Provide emotional support with 1 on 1 interaction with staff  - Encourage recovery focused program/ addiction education  - Assess for verbalization of changing behaviors related to substance abuse  - Initiate consults and referrals as appropriate (Case Management, Spiritual Care, etc )  Outcome: Progressing  Goal: By discharge, will develop insight into their chemical dependency and sustain motivation to continue in recovery  Description  INTERVENTIONS:  - Attends all daily group sessions and scheduled AA groups  - Actively practices coping skills through participation in the therapeutic community and adherence to program rules  - Reviews and completes assignments from individual treatment plan  - Assist patient development of understanding of their personal cycle of addiction and relapse triggers  Outcome: Progressing  Goal: By discharge, patient will have ongoing treatment plan addressing chemical dependency  Description  INTERVENTIONS:  - Assist patient with resources and/or appointments for ongoing recovery based living  Outcome: Progressing

## 2019-11-08 NOTE — ASSESSMENT & PLAN NOTE
Patient cleared for psychiatric admission  EKG reviewed  We will now sign off please feel free to call if further assistance is needed form internal medicine

## 2019-11-08 NOTE — ASSESSMENT & PLAN NOTE
Patients   AST 71  Continue to trend, if consistent elevation further work up warranted   Repeats CMP ordered

## 2019-11-08 NOTE — PLAN OF CARE
Worker attempted to meet with pt  Pt reported being agreeable to complete biopsychosocial assessment but would fall asleep between each question  Worker will attempt to complete biopsychosocial at a later time

## 2019-11-08 NOTE — TREATMENT PLAN
TREATMENT PLAN REVIEW - [facility] Jose Martell 24 y o  1995 male MRN: [id number]    [address] [place] [address] Room / Bed: [facility] 382/[facility] 93[facility] Encounter: [id number]          Admit Date/Time:  11/6/2019  2:57 PM    Treatment Team: Attending Provider: Laly Bull MD; Consulting Physician: Torie Driver; Patient Care Assistant: Britney Vergara; Licensed Practical Nurse: Bharath Badillo LPN; Patient Care Assistant: Maryuri Villatoro; Care Manager: Nilda Ayala; Registered Nurse: Arlene Coyle RN; Advanced Practitioner: Torie Driver; Patient Care Technician: Tosha Fields;  : Sameera Fischer    Diagnosis: MDD    Patient Strengths: cooperative, communication skills     Patient Barriers: limited education, limited family ties, limited support system    Short Term Goals: decrease in depressive symptoms, decrease in anxiety symptoms, decrease in suicidal thoughts    Long Term Goals: improvement in depression, resolution of depressive symptoms, stabilization of mood, free of suicidal thoughts, resolution of withdrawal symptoms    Progress Towards Goals: starting psychitric medications as prescribed, starting opioid detoxification protocol    Recommended Treatment: medication management, patient medication education, group therapy, milieu therapy, continued Behavioral Health psychiatric evaluation/assessment process     Treatment Frequency: daily medication monitoring, group and milieu therapy daily, monitoring through interdisciplinary rounds, monitoring through weekly patient care conferences    Expected Discharge Date:  3-5 days    Discharge Plan: referral for outpatient drug and alcohol counseling, referral for outpatient medication management with a psychiatrist, referral for outpatient psychotherapy    Treatment Plan Created/Updated By: Laly Bull MD

## 2019-11-08 NOTE — DISCHARGE INSTR - OTHER ORDERS
If you are experiencing a mental health emergency, you may call the [address] [place] [address] 24 hours a day, 7 days per week at [phone]  [facility], call [phone]  HOW TO GET SUBSTANCE ABUSE HELP:  If you or someone you know has a drug or alcohol problem, there is help:  [address]: [address]: [phone]  An assessment is the first step  In addition to those listed there are other programs available in the area but assessment is best to determine an appropriate level of care  If you DO NOT have Medical Assistance ([place]) or [employer], an assessment can be scheduled at one of these providers:  425 [facility] [address], [address]  [phone]   101 [facility] [address] , hospitals, [address]  3314 [facility] [address]  [address], 75 [facility] [place], [facility]   Step by [facility] [address] , hospitals, [address] [place] [address]  [phone]   Treatment Trends  [place]  1320 [facility] , hospitals, [address] [place] [address]  2000 [facility],[address] , [address], hospitals, [address] Liam [phone]     If you [address], an assessment can be scheduled at one of these providers:  [place] on Alcohol & Drug Abuse  [address] , hospitals, 98 [facility]  [address]  [address], [address]  [address]  D&A Intake Unit  620 [facility] [address] , 1st Floor, [facility], [address]  [address], 300 [facility],6Th Floor, Olds, [address] [place] [address] [place] [address] [facility] [address] , hospitals, [address]  3314 [facility] [address]   [facility], [facility] [address] [facility]) [place] 57 [facility], [facility], [address] 253 [facility] 721 [facility] [place], [address]   Step by 8012 [facility] [address] , [place], 98 [facility]  [phone]   Treatment Trends  [place]  1320 [facility] , [place], 98 [facility]  2000 [facility],[address] , [address], [place], [address] [facility] [phone]     If you [address], an assessment can be scheduled at one of these providers  Please contact these Providers to determine if they are in your network plan:  [facility] D&A Intake Unit  620 [facility] [address] , 1st Floor, [facility], [address]  [address] , [place], [address]  3314 [facility] [address]  [facility], 122 [facility] [place] 57 [facility], [facility], [address] 253 [facility] 5633 N  [facility], 2042 [facility] [address] , [address], [place], [address] [phone]   The [facility] Family-to-Family Education Program is a free 12-week (2 1/2 hours/week) course for families of individuals with severe brain disorders (mental illnesses)  The classes are taught by trained family members  All course materials are furnished at no cost to you  Below are some details  To register, e-mail [email or web address] or call [phone]  The curriculum focuses on schizophrenia, bipolar disorder (manic depression), clinical depression, panic disorders and obsessive-compulsive disorder (OCD)  The course discusses the clinical treatment of these illnesses and teaches the knowledge and skills that family members need to cope more effectively    Topics Include:  Learning about feelings, learning about facts   Schizophrenia, major depression and moises: diagnosis and dealing with critical periods   Subtypes of depression and bipolar disorder, panic disorder and OCD; diagnosis and causes; sharing our stories   The biology of the brain/new research   Problem solving workshops   Medication review   Empathy workshop  what its like to have a brain disorder   Communication skills workshop   Self-care and relative groups   Rehabilitation, services available   Advocacy: fighting stigma   Review and certification ceremony    Lezp-ch-Xusi Education Course  The [employer] Education class is a ten week  two hours per week  experiential education course on the topic of recovery for any person with serious mental illness who is interested in establishing and maintaining wellness  The course uses a combination of lecture, interactive exercises, and structural group processes  The diversity of experience among participants affords for a lively dynamic that moves the course along  [employer]s Gfhk-ub-Yoli Education class is offered free of charge to people who experience mental illness  You do not need to be a member of [facility] to take the course  Courses are taught by teams of trained mentors/peer-teachers who are themselves experienced at living well with mental illness  Below are some details  To register, call [phone] or e-mail [email or web address]  Sign up today! [facility] group is for family members, caregivers, and loved ones of individuals living with the everyday challenges of mental illness  The leaders are family members in the same situation  Sessions take place in an intimate, confidential setting to allow families to share openly with each other  These support groups allow participants to learn from the experiences of other group members, share coping strategies, and offer each other encouragement and understanding  Rose Marie Bahena know that you are not alone  Drop inno registration is necessary  Here are the times and locations    JEFFERSON  Monthly: 3rd Monday, 7:00-8:30 pm  [facility] SAYRA [facility]  [address]   Monthly: 4th Tuesday, 7:00-8:30 pm  [facility]  Monthly: 1st Monday, 7:00-8:30 pm  [facility]  [address] [facility], [address] [place] [address]         Monthly Support for Persons with Mental Illness  The Peer Support Group is a monthly meeting for individuals facing the challenges of recovering from severe and persistent mental illness  Depression, manic depression, schizophrenia, and general anxiety disorder are only a few of the diagnoses of individuals who have found a supportive place at our meetings  Our [place]  We are a fellowship of individuals who share a common goal of recovery and the ability to maintain mental and emotional stability  We help others and ourselves through sharing our experiences, strength and hope with each other  No matter how traumatic our past or how despairing our present may be, there is hope for a new day  Sessions take place in an intimate, confidential setting to allow individuals to share openly with each other  Willene Buys know that you are not alone  Drop inno registration is necessary  Here are the times and locations  ERIKA  Monthly: 1st Monday, 7:00-8:30 pm  [facility]  [address] [place], [place]   [id number]  Monthly: 3rd Monday, 7:00-8:30 pm  [address]         When you need someone to listen, the [place] is available for 16 hours a day, 7 days a week, from the time of 7-10am and 2pm-2am   It is not available from the hours of 2am-6am and 10am-2pm  A representative can be reached at [phone]

## 2019-11-08 NOTE — PROGRESS NOTES
Patient pleasant and cooperative during morning med pass  Patient reports 3/4 anxiety  Patient denies depression, S/HI, and A/VH  Patient visible and was on the phone with family  Will continue to monitor

## 2019-11-09 PROCEDURE — 99232 SBSQ HOSP IP/OBS MODERATE 35: CPT | Performed by: PHYSICIAN ASSISTANT

## 2019-11-09 RX ADMIN — CLONIDINE HYDROCHLORIDE 0.05 MG: 0.1 TABLET ORAL at 21:05

## 2019-11-09 RX ADMIN — BUPRENORPHINE HYDROCHLORIDE, NALOXONE HYDROCHLORIDE 2 FILM: 2; .5 FILM, SOLUBLE BUCCAL; SUBLINGUAL at 08:40

## 2019-11-09 RX ADMIN — MIRTAZAPINE 15 MG: 15 TABLET, FILM COATED ORAL at 21:05

## 2019-11-09 RX ADMIN — CLONIDINE HYDROCHLORIDE 0.05 MG: 0.1 TABLET ORAL at 08:39

## 2019-11-09 RX ADMIN — IBUPROFEN 600 MG: 600 TABLET ORAL at 17:51

## 2019-11-09 RX ADMIN — CLONIDINE HYDROCHLORIDE 0.05 MG: 0.1 TABLET ORAL at 13:03

## 2019-11-09 NOTE — PROGRESS NOTES
Patient med and meal compliant  Med education on sublingual given and understood with demonstration  Denies all signs and symptoms  Retreated to room after breakfast   Will continue to monitor

## 2019-11-09 NOTE — PLAN OF CARE
Problem: SELF HARM/SUICIDALITY  Goal: Will have no self-injury during hospital stay  Description  INTERVENTIONS:  - Q 15 MINUTES: Routine safety checks  - Q WAKING SHIFT & PRN: Assess risk to determine if routine checks are adequate to maintain patient safety  - Encourage patient to participate actively in care by formulating a plan to combat response to suicidal ideation, identify supports and resources  Outcome: Progressing     Problem: DEPRESSION  Goal: Will be euthymic at discharge  Description  INTERVENTIONS:  - Administer medication as ordered  - Provide emotional support via 1:1 interaction with staff  - Encourage involvement in milieu/groups/activities  - Monitor for social isolation  Outcome: Progressing     Problem: SUBSTANCE USE/ABUSE  Goal: Will have no detox symptoms and will verbalize plan for changing substance-related behavior  Description  INTERVENTIONS:  - Monitor physical status and assess for symptoms of withdrawal  - Administer medication as ordered  - Provide emotional support with 1 on 1 interaction with staff  - Encourage recovery focused program/ addiction education  - Assess for verbalization of changing behaviors related to substance abuse  - Initiate consults and referrals as appropriate (Case Management, Spiritual Care, etc )  Outcome: Progressing  Goal: By discharge, will develop insight into their chemical dependency and sustain motivation to continue in recovery  Description  INTERVENTIONS:  - Attends all daily group sessions and scheduled AA groups  - Actively practices coping skills through participation in the therapeutic community and adherence to program rules  - Reviews and completes assignments from individual treatment plan  - Assist patient development of understanding of their personal cycle of addiction and relapse triggers  Outcome: Progressing  Goal: By discharge, patient will have ongoing treatment plan addressing chemical dependency  Description  INTERVENTIONS:  - Assist patient with resources and/or appointments for ongoing recovery based living  Outcome: Progressing     Problem: Ineffective Coping  Goal: Participates in unit activities  Description  Interventions:  - Provide therapeutic environment   - Provide required programming   - Redirect inappropriate behaviors   Outcome: Progressing     Problem: DISCHARGE PLANNING  Goal: Discharge to home or other facility with appropriate resources  Description  INTERVENTIONS:  - Identify barriers to discharge w/patient and caregiver  - Arrange for needed discharge resources and transportation as appropriate  - Identify discharge learning needs (meds, wound care, etc )  - Arrange for interpretive services to assist at discharge as needed  - Refer to Case Management Department for coordinating discharge planning if the patient needs post-hospital services based on physician/advanced practitioner order or complex needs related to functional status, cognitive ability, or social support system  Outcome: Progressing

## 2019-11-09 NOTE — PROGRESS NOTES
Progress Note - Behavioral Health     Cehlsey Fagan 22 y o  male MRN: [id number]   Unit/Bed#: [facility] 382-01 Encounter: [id number]    Per Nursing: Nursing reports that patient has been cooperative on the unit  He has admitted to 2/4 anxiety, but denied all other symptoms  He has been compliant with his medications  Per Patient: Patient states that he feels good, however he reports that he did not sleep last night  He reports that the medication that is supposed to help him sleep did not help at all  He feels tired and he is currently lying in bed  He reports that he feels happy otherwise  He denies having any significant depression or sadness, as well as any anxiety  He denies having any thoughts of wanting to harm himself or others  He denies having any active or passive suicidal ideation, intent or plan  He denies having any auditory or visual hallucinations  He reports that he is looking forward to being discharged soon  He is future oriented and goal-directed, as well as motivated and help seeking  He denies any negative side effects and does not have any complaints, other than his inability to sleep last evening  Suhas Iqbal He is able to contract for safety while on the unit  Behavior over the last 24 hours: unchanged  Sleep: insomnia, decreased, slept off and on, frequent awakenings  Appetite: normal  Medication side effects: No   ROS: reports fatigue  Any positives in the Comprehensive Review of Systems were noted in the HPI  All other Review of Systems were negative          Mental Status Evaluation:    Appearance:  age appropriate, casually dressed, laying in bed   Behavior:  Normal, mildly guarded, cooperative   Speech:  soft   Mood:  normal   Affect:  constricted   Thought Process:  organized, goal directed   Associations: intact associations   Thought Content:  no overt delusions   Perceptual Disturbances: no auditory hallucinations, does not appear responding to internal stimuli   Risk Potential: Suicidal ideation - None at present, contracts for safety on the unit  Homicidal ideation - None at present  Potential for aggression - Not at present   Sensorium:  oriented to person, place and time/date   Memory:  recent and remote memory grossly intact   Consciousness:  alert and awake   Attention: attention span and concentration appear shorter than expected for age   Insight:  fair   Judgment: fair   Gait/Station: in bed   Motor Activity: no abnormal movements     Vital signs in last 24 hours:  Vitals:    11/09/19 0759   BP: 112/78   Pulse: 80   Resp: 16   Temp: 98 5 °F (36 9 °C)   SpO2:          Laboratory results:   I have personally reviewed all pertinent laboratory/tests results  Labs in last 72 hours:   Recent Labs     11/06/19  1520 11/08/19  0633   WBC 11 60* 10 70   RBC 4 92 5 03   HGB 13 9 14 3   HCT 40 7* 42 1    359   RDW 13 8 13 5   NEUTROABS 8 80* 7 30   SODIUM 141 140   K 4 2 4 1    102   CO2 33* 29   BUN 12 12   CREATININE 0 91 0 77   GLUC 94 101*   GLUF  --  101*   CALCIUM 9 5 9 2   * 71*   * 244*   ALKPHOS 45 39*   TP 7 5 7 2   ALB 4 5 4 1   TBILI 0 80 0 60   CHOLESTEROL 155  --    HDL 29*  --    TRIG 72  --    LDLCALC 112  --    VDL2KBGVBLLH 3 670  --    RPR Non-Reactive  --        Progress Toward Goals: progressing    Assessment/Plan   Principal Problem:    Severe episode of recurrent major depressive disorder, without psychotic features ([facility] [address] )  Active Problems:    Medical clearance for psychiatric admission    Prolonged Q-T interval on ECG    Elevated liver enzymes    Recommended Treatment:     Planned medication and treatment changes:     All current active medications have been reviewed  Encourage group therapy, milieu therapy and occupational therapy  Behavioral Health checks every 7 minutes  Continue current medications:    Current Facility-Administered Medications:  aluminum-magnesium hydroxide-simethicone 30 mL Oral Q4H PRN Theresa Marsh MD benztropine 1 mg Intramuscular Q6H PRN Lakisha Velarde MD   benztropine 1 mg Oral Q6H PRN Lakisha Velarde MD   buprenorphine-naloxone 1 Film Sublingual Q4H PRN Lakisha Velarde, MD   buprenorphine-naloxone 2 Film Sublingual Daily Lakisha Velarde, MD   cloNIDine 0 05 mg Oral 4x Daily Lakisha Velarde, MD   cloNIDine 0 1 mg Oral Q6H PRN Lakisha Velarde, MD   dicyclomine 10 mg Oral Q6H PRN Lakisha Coats, MD   haloperidol 5 mg Oral Q8H PRN Lakisha Coats, MD   haloperidol lactate 5 mg Intramuscular Q6H PRN Lakisha Coats, MD   hydrOXYzine HCL 25 mg Oral Q6H PRN Lakisha Coats, MD   ibuprofen 400 mg Oral Q8H PRN Lakisha Coats, MD   ibuprofen 600 mg Oral Q8H PRN Lakisha Coats, MD   ibuprofen 800 mg Oral Q8H PRN Lakisha Halfway, MD   influenza vaccine 0 5 mL Intramuscular Prior to discharge Lakisha Velarde MD   loperamide 2 mg Oral Q4H PRN Lakisha Coats, MD   LORazepam 1 mg Intramuscular Q6H PRN Lakisha Velarde MD   LORazepam 1 mg Oral Q8H PRN Lakisha Velarde, MD   magnesium hydroxide 30 mL Oral Daily PRN Lakisha Velarde, MD   methocarbamol 500 mg Oral Q6H PRN Lakisha Coats, MD   mirtazapine 15 mg Oral HS Lakisha Velarde, MD   nicotine polacrilex 2 mg Oral Q2H PRN Lakisha Velarde, MD   OLANZapine 10 mg Intramuscular Q3H PRN Lakisha Velarde, MD   OLANZapine 5 mg Oral Q3H PRN Lakisha Velarde, MD   ondansetron 4 mg Oral Q6H PRN Lakisha Velarde, MD   risperiDONE 1 mg Oral Q3H PRN Lakisha Velarde, MD   traZODone 50 mg Oral HS PRN Lakisha Velarde MD           Plan:  1) Patient presents with mild improvement in mood and anxiety symptoms  He is pleasant and cooperative during interaction with Provider  He reports that Remeron 7 5 mg was not effective at helping him sleep last evening, but he was reassured when he was informed that the medication would be increased as previously planned    2) Continue all current medications as directed:    Suboxone 2-0 5 mg, 2 films daily sublingually   Clonidine 0 05 mg four times daily by mouth   Remeron 15 mg once daily by mouth at bedtime  o Increased dose will be administered tonight to assist with insomnia as previously planned and ordered  3) Medical   All medical comorbidities are under the care of [facility] [address] [facility]  4) Continue to work with Case Management to determine patient's disposition following discharge  Risks / Benefits of Treatment:    Risks, benefits, and possible side effects of medications explained to patient and patient verbalizes understanding and agreement for treatment  Counseling / Coordination of Care:    Patient's progress reviewed with nursing staff  Medications, treatment progress and treatment plan reviewed with patient      Chelo Emerson PA-C 11/09/19

## 2019-11-09 NOTE — PROGRESS NOTES
Progress Note - Behavioral Health     Juancho Rodriguez 22 y o  male MRN: [id number]   Unit/Bed#: [facility] 382-01 Encounter: [id number]    Per Nursing: Nursing reports that patient has been calm and cooperative with Nursing staff  He allowed bloodwork to be drawn without issue or incident  He has denied any complaints and has been compliant with his medications  Per Patient: Patient states that he feels "good " He reports that he slept much better last evening since Remeron was increased  He feels much better overall  He reports that his mood is "happy " He denies any significant feelings of depression or sadness  He denies any thoughts of wanting to harm himself or others  He denies any active or passive suicidal ideation, intent or plan  He denies any auditory or visual hallucinations  He also reports that his back pain has improved  He is noticeably brighter upon interaction and is visible on the unit  Behavior over the last 24 hours: slowly improving  Sleep: improved, slept better  Appetite: normal  Medication side effects: No   ROS: no complaints, reports improvement in back pain  Any positives in the Comprehensive Review of Systems were noted in the HPI  All other Review of Systems were negative          Mental Status Evaluation:    Appearance:  age appropriate, casually dressed, good eye contact, appears happier and more cheerful when compared with previous interview   Behavior:  normal, pleasant, cooperative, calm   Speech:  soft, speaks English coherently   Mood:  normal, improved, euthymic   Affect:  constricted   Thought Process:  organized, goal directed   Associations: intact associations   Thought Content:  no overt delusions   Perceptual Disturbances: denies auditory hallucinations when asked, does not appear responding to internal stimuli   Risk Potential: Suicidal ideation - None at present, contracts for safety on the unit  Homicidal ideation - None at present  Potential for aggression - Not at present   Sensorium:  oriented to person, place and time/date   Memory:  recent and remote memory grossly intact   Consciousness:  alert and awake   Attention: attention span and concentration are age appropriate   Insight:  improving   Judgment: improving   Gait/Station: normal gait/station   Motor Activity: no abnormal movements     Vital signs in last 24 hours:  Vitals:    11/10/19 0700   BP: 124/72   Pulse: 66   Resp:    Temp: 98 3 °F (36 8 °C)   SpO2:          Laboratory results:   I have personally reviewed all pertinent laboratory/tests results  Labs in last 72 hours:   Recent Labs     11/08/19  0633 11/10/19  0709   WBC 10 70  --    RBC 5 03  --    HGB 14 3  --    HCT 42 1  --      --    RDW 13 5  --    NEUTROABS 7 30  --    SODIUM 140 140   K 4 1 4 5    102   CO2 29 34*   BUN 12 9   CREATININE 0 77 0 97   GLUC 101* 91   GLUF 101*  --    CALCIUM 9 2 9 3   AST 71* 83*   * 240*   ALKPHOS 39* 44   TP 7 2 6 9   ALB 4 1 3 9   TBILI 0 60 0 50       Progress Toward Goals: progressing    Assessment/Plan   Principal Problem:    Severe episode of recurrent major depressive disorder, without psychotic features ([facility] [address] )  Active Problems:    Medical clearance for psychiatric admission    Prolonged Q-T interval on ECG    Elevated liver enzymes    Recommended Treatment:     Planned medication and treatment changes:     All current active medications have been reviewed  Encourage group therapy, milieu therapy and occupational therapy  Behavioral Health checks every 7 minutes  Continue current medications:    Current Facility-Administered Medications:  aluminum-magnesium hydroxide-simethicone 30 mL Oral Q4H PRN Vi Cosme MD   benztropine 1 mg Intramuscular Q6H PRN Vi Cosme MD   benztropine 1 mg Oral Q6H PRN Vi Cosme MD   buprenorphine-naloxone 1 Film Sublingual Q4H PRN Vi Cosme MD   buprenorphine-naloxone 2 Film Sublingual Daily Vi Cosme MD   cloNIDine 0 05 mg Oral 4x Daily Lyle Moore MD   cloNIDine 0 1 mg Oral Q6H PRDAV Moore MD   dicyclomine 10 mg Oral Q6H PRDAV Moore MD   haloperidol 5 mg Oral Q8H PRDAV Moore MD   haloperidol lactate 5 mg Intramuscular Q6H PRDAV Moore MD   hydrOXYzine HCL 25 mg Oral Q6H PRDAV Moore MD   ibuprofen 400 mg Oral Q8H PRDAV Moore MD   ibuprofen 600 mg Oral Q8H PRDAV Moore MD   ibuprofen 800 mg Oral Q8H PRN Lyle Moore MD   influenza vaccine 0 5 mL Intramuscular Prior to discharge Lyle Moore MD   loperamide 2 mg Oral Q4H PRDAV Moore MD   LORazepam 1 mg Intramuscular Q6H PRDAV Moore MD   LORazepam 1 mg Oral Q8H PRDAV Moore MD   magnesium hydroxide 30 mL Oral Daily PRN Lyle Moore MD   methocarbamol 500 mg Oral Q6H PRDAV Moore MD   mirtazapine 15 mg Oral HS Lyle Moore MD   nicotine polacrilex 2 mg Oral Q2H PRDAV Moore MD   OLANZapine 10 mg Intramuscular Q3H PRDAV Moore MD   OLANZapine 5 mg Oral Q3H PRDAV Moore MD   ondansetron 4 mg Oral Q6H PRDAV Moore MD   risperiDONE 1 mg Oral Q3H PRDAV Moore MD   traZODone 50 mg Oral HS PRN Lyle Moore MD           Plan:  1) Patient presents with continued improvement in mood and anxiety symptoms  He is noticeably brighter and more optimistic during interaction with Provider  He reports that Remeron 15 mg was very effective at helping him sleep last evening, and he feels much better overall    2) Continue all current medications as directed:    Suboxone 2-0 5 mg, 2 films daily sublingually   Clonidine 0 05 mg four times daily by mouth   Remeron 15 mg once daily by mouth at bedtime  o Increased dose was administered last evening to assist with insomnia  o Patient reports significant improvement in ability to sleep with increased dose of Remeron  3) Medical   All medical comorbidities are under the care of [facility]  4) Continue to work with Case Management to determine patient's disposition following discharge  Risks / Benefits of Treatment:    Risks, benefits, and possible side effects of medications explained to patient and patient verbalizes understanding and agreement for treatment  Counseling / Coordination of Care:    Patient's progress reviewed with nursing staff  Medications, treatment progress and treatment plan reviewed with patient      Estefani Thapa PA-C 11/10/19

## 2019-11-09 NOTE — H&P
Psychiatric Evaluation - Behavioral Health     Identification Data:Stefan Benavidez Andrew 22 y o  male MRN: [id number]  Unit/Bed#: [facility]-01 Encounter: [id number]    Chief Complaint: "I thought I was losing my mind", "I don't want to go on living like this" and depression    History of Present Illness     Beatrice King is a 22 y o  male with a history of Major Depressive Disorder who was admitted to the inpatient psychiatric unit on a voluntary 201 commitment basis due to depression, anxiety and unstable mood  Symptoms prior to admission included worsening depression, depression, feeling depressed, suicidal behavior, suicidal ideation, feeling suicidal, difficulty sleeping and anxiety symptoms  Onset of symptoms was gradual starting several months ago with slowly worsening course since that time  Stressors preceding admission included drug use problems and death of grandfather  On initial evaluation after admission to the inpatient psychiatric unit the patient  Calm, depressed, tired, had symptoms of opiate withdrawal but moderate  He stated that the medications that the writer already started prior seeing the patient, clonidine help the patient to deal with chills and other unpleasant symptoms of opiate withdrawal   The patient stated that the last time his heroin was about 2 days ago  He his withdrawal most likely will reached a peak this evening, based on the previous patient's experience        the patient denied any history of previous inpatient admission or talking to psychiatrist or psychologist about his depression  The patient stated that death of his grandfather year ago was the factors that severely affect his mind, his mood and the patient suffer from intense grief and went today thinking about his grandfather lead to worsening of his depression  The patient also endorses problems with sleep, lack of motivation, loss of energy, and sadness    Suicidal thoughts started getting more and more intense, and the patient relapsed on heroin that also lead to worsening of hopelessness and helplessness  The patient denied previous history of overdose stated that he use cocaine occasionally but he does not have any cravings to cocaine  The patient also occasionally smoke marihuana  The patient describes symptoms of opioid withdrawal and cravings  He stated that he cannot control his addiction and he wants to get help      Psychiatric Review Of Systems:    sleep changes: decreased  appetite changes: no  energy/anergy: decreased  anxiety/panic: yes  moises: no  self injurious behavior/risky behavior: yes  Suicidal ideation: yes  Homicidal ideation: no  Auditory hallucinations: no  Visual hallucinations: no  Delusional thinking: no      Historical Information     Past Psychiatric History:     Past Inpatient Psychiatric Treatment:   No history of past inpatient psychiatric admissions  Past Outpatient Psychiatric Treatment:    Has never seen a psychiatrist prior to admission  Past Suicide Attempts:    no  Past Psychiatric Medication Trials:    none     Substance Abuse History:  Social History     Tobacco History     Smoking Status  Never Smoker    Smokeless Tobacco Use  Current User          Alcohol History     Alcohol Use Status  Not Currently          Drug Use     Drug Use Status  Yes Types  Heroin, Prescription          Sexual Activity     Sexually Active  Not Asked          Activities of Daily Living    Not Asked               Additional Substance Use Detail     Questions Responses    Substance Use Assessment Substance use within the past 12 months    Alcohol Use Frequency Denies use in past 12 months    Cannabis frequency Never used    Comment: Never used on 11/7/2019     Heroin Frequency Daily    Heroin Method Snort    Heroin Last Use & Amount 11/06/2019    Cocaine frequency Never used    Comment: Never used on 11/7/2019     Crack Cocaine Frequency Denies use in past 12 months    Methamphetamine Frequency Denies use in past 12 months    Narcotic Frequency Denies use in past 12 months    Benzodiazepine Frequency Denies use in past 12 months    Amphetamine frequency Denies use in past 12 months    Barbituate Frequency Denies use use in past 12 months    Inhalant frequency Never used    Comment: Never used on 11/7/2019     Hallucinogen frequency Never used    Comment: Never used on 11/7/2019     Ecstasy frequency Never used    Comment: Never used on 11/7/2019     Other drug frequency Never used    Comment: Never used on 11/7/2019     Opiate frequency Denies use in past 12 months    Last reviewed by Raghu Zaragoza RN on 11/7/2019        I have assessed this patient for substance use within the past 12 months    History of Inpatient/Outpatient rehabilitation program: no  Smoking history: occasionally    Family Psychiatric History:     Psychiatric Illness:  patient denies  Substance Abuse:  patient denies  Suicide Attempts:  patient denies    Social History:    Marital History: single    Traumatic History:     Abuse: not willing to provide details    Past Medical History:    History of Seizures: no    Past Medical History:   Diagnosis Date    Addiction to drug ([address] )     No known problems      Past Surgical History:   Procedure Laterality Date    NO PAST SURGERIES         Medical Review Of Systems:    EFO Review Of Systems: Constitutional: negative  Respiratory: negative  Cardiovascular: negative  Gastrointestinal: negative  Musculoskeletal:negative  Neurological: negative  Endocrine: negative  Allergic/Immunologic: negative    Allergies:    No Known Allergies    Medications: All current active medications have been reviewed      Objective     Vital signs in last 24 hours:    Temp:  [97 5 °F (36 4 °C)-98 1 °F (36 7 °C)] 98 1 °F (36 7 °C)  HR:  [70-86] 86  Resp:  [16] 16  BP: (107-124)/(65-76) 119/76    No intake or output data in the 24 hours ending 11/08/19 7773     Mental Status Evaluation:      Appearance: dressed appropriately, casually dressed   Behavior:  cooperative, calm   Mood:  depressed, anxious   Affect: constricted    Speech:  decreased rate, slow   Language: appropriate   Thought Process:  concrete   Associations: concrete associations   Thought Content:  negative thinking   Perceptual Disturbances: denies auditory hallucinations when asked, does not appear responding to internal stimuli   Risk Potential: Suicidal ideation - None at present  Homicidal ideation - None  Potential for aggression - No   Sensorium:  oriented to person, place and time   Memory:  recent and remote memory grossly intact   Consciousness:  alert and awake   Attention: attention span and concentration are normal   Fund of Knowledge: awareness of current events appropriate   Insight:  impaired   Judgment: limited   Muscle Tone: normal   Gait/Station: normal gait/station and normal balance   Motor Activity: no abnormal movements               Laboratory Results: I have personally reviewed all pertinent laboratory/tests results  Imaging Studies: No results found  Code Status: Level 1 - Full Code    Assessment/Plan   Principal Problem:    Severe episode of recurrent major depressive disorder, without psychotic features ([facility] [address] )  Active Problems:    Medical clearance for psychiatric admission    Prolonged Q-T interval on ECG    Elevated liver enzymes      Treatment Plan:     Planned Treatment and Medication Changes: All current active medications have been reviewed  Encourage group therapy, milieu therapy and occupational therapy  Behavioral Health checks every 7 minutes  Start   Start supportive therapy, medication management of withdrawal clonidine based today, and if withdrawal symptoms reach his peak tonight, will start Suboxone tomorrow was 4 mg 1st dose and as needed 2 mg every 6 hours if the 1st dose will not lead to improvement    It is unlikely that the patient will have precipitated withdrawals 3 days after his use of heroin  The patient stated that 2nd days the most difficult day for him  To treat patient's depression with insomnia will start low dose of Remeron 7 5 mg tonight and increase to 15 mg tomorrow patient expresses understanding with his treatment  We discussed that Suboxone is a maintenance treatment and sometimes is not easy to stop Suboxone because Suboxone has its own withdrawal potential   However, we discussed the Suboxone as a maintenance treatment is successful for many patients who cannot control the addiction and suffer from cravings and frequent relapses      Current Facility-Administered Medications:  aluminum-magnesium hydroxide-simethicone 30 mL Oral Q4H PRN Alejandro Brito MD   benztropine 1 mg Intramuscular Q6H PRN Alejandro Brito MD   benztropine 1 mg Oral Q6H PRN Alejandro Brito MD   buprenorphine-naloxone 1 Film Sublingual Q8H PRN MD Yvette Ruiz ON 11/9/2019] buprenorphine-naloxone 2 Film Sublingual Daily Alejandro Brito MD   cloNIDine 0 05 mg Oral 4x Daily Alejandro Brito MD   cloNIDine 0 1 mg Oral Q6H PRN Alejandro Brito MD   dicyclomine 10 mg Oral Q6H PRN Alejandro Brito MD   haloperidol 5 mg Oral Q8H PRN Alejandro Brito MD   haloperidol lactate 5 mg Intramuscular Q6H PRN Alejandro Brito MD   hydrOXYzine HCL 25 mg Oral Q6H PRN Alejandro Brito MD   ibuprofen 400 mg Oral Q8H PRN Alejandro Brito MD   ibuprofen 600 mg Oral Q8H PRN Alejandro Brito MD   ibuprofen 800 mg Oral Q8H PRN Alejandro Brito MD   influenza vaccine 0 5 mL Intramuscular Prior to discharge Alejandro Brito MD   loperamide 2 mg Oral Q4H PRN Alejandro Brito MD   LORazepam 1 mg Intramuscular Q6H PRN Alejandro Brito MD   LORazepam 1 mg Oral Q8H PRN Alejandro Brito MD   magnesium hydroxide 30 mL Oral Daily PRN Alejandro Brito MD   methocarbamol 500 mg Oral Q6H PRN Alejandro Brito MD   mirtazapine 7 5 mg Oral HS Alejandro Brito MD   nicotine polacrilex 2 mg Oral Q2H PRN Genesis Cunha MD   OLANZapine 10 mg Intramuscular Q3H PRN Genesis Cunha MD   OLANZapine 5 mg Oral Q3H PRN Genesis Cunha MD   ondansetron 4 mg Oral Q6H PRN Genesis Cunha MD   risperiDONE 1 mg Oral Q3H PRN Genesis Cunha MD   traZODone 50 mg Oral HS PRN Genesis Cunha MD       Risks / Benefits of Treatment:    Risks, benefits, and possible side effects of medications explained to patient and patient verbalizes understanding and agreement for treatment  Counseling / Coordination of Care:    Patient's presentation on admission and proposed treatment plan discussed with treatment team   Diagnosis, medication changes and treatment plan reviewed with patient  Inpatient Psychiatric Certification:    Estimated length of stay: 6 midnights    Based upon physical, mental and social evaluations, I certify that inpatient psychiatric services are medically necessary for this patient for a duration of 5- 7 midnights for the treatment of Severe episode of recurrent major depressive disorder, without psychotic features ([facility] [address] )    Available alternative community resources do not meet the patient's mental health care needs  I further attest that an established written individualized plan of care has been implemented and is outlined in the patient's medical records  ** Please Note: This note has been constructed using a voice recognition system   **

## 2019-11-10 LAB
ALBUMIN SERPL BCP-MCNC: 3.9 G/DL (ref 3–5.2)
ALP SERPL-CCNC: 44 U/L (ref 43–122)
ALT SERPL W P-5'-P-CCNC: 240 U/L (ref 9–52)
ANION GAP SERPL CALCULATED.3IONS-SCNC: 4 MMOL/L (ref 5–14)
AST SERPL W P-5'-P-CCNC: 83 U/L (ref 17–59)
BILIRUB SERPL-MCNC: 0.5 MG/DL
BUN SERPL-MCNC: 9 MG/DL (ref 5–25)
CALCIUM SERPL-MCNC: 9.3 MG/DL (ref 8.4–10.2)
CHLORIDE SERPL-SCNC: 102 MMOL/L (ref 97–108)
CO2 SERPL-SCNC: 34 MMOL/L (ref 22–30)
CREAT SERPL-MCNC: 0.97 MG/DL (ref 0.7–1.5)
GFR SERPL CREATININE-BSD FRML MDRD: 110 ML/MIN/1.73SQ M
GLUCOSE SERPL-MCNC: 91 MG/DL (ref 70–99)
POTASSIUM SERPL-SCNC: 4.5 MMOL/L (ref 3.6–5)
PROT SERPL-MCNC: 6.9 G/DL (ref 5.9–8.4)
SODIUM SERPL-SCNC: 140 MMOL/L (ref 137–147)

## 2019-11-10 PROCEDURE — 99231 SBSQ HOSP IP/OBS SF/LOW 25: CPT | Performed by: PHYSICIAN ASSISTANT

## 2019-11-10 PROCEDURE — 80053 COMPREHEN METABOLIC PANEL: CPT | Performed by: NURSE PRACTITIONER

## 2019-11-10 RX ADMIN — MIRTAZAPINE 15 MG: 15 TABLET, FILM COATED ORAL at 21:09

## 2019-11-10 RX ADMIN — IBUPROFEN 800 MG: 400 TABLET ORAL at 21:14

## 2019-11-10 RX ADMIN — BUPRENORPHINE HYDROCHLORIDE, NALOXONE HYDROCHLORIDE 2 FILM: 2; .5 FILM, SOLUBLE BUCCAL; SUBLINGUAL at 08:55

## 2019-11-10 RX ADMIN — CLONIDINE HYDROCHLORIDE 0.05 MG: 0.1 TABLET ORAL at 08:54

## 2019-11-10 RX ADMIN — NICOTINE POLACRILEX 2 MG: 2 GUM, CHEWING ORAL at 20:50

## 2019-11-10 RX ADMIN — CLONIDINE HYDROCHLORIDE 0.05 MG: 0.1 TABLET ORAL at 21:09

## 2019-11-10 NOTE — NURSING NOTE
Patient allowed staff to draw bloodwork    Did not request any PRN medication during the night  Slept better than yesterday  No issues related to behaviors  Cooperative with staff

## 2019-11-10 NOTE — PROGRESS NOTES
11/09/19 1110   Team Meeting   Meeting Type Daily Rounds   Initial Conference Date 11/09/19   Team Members Present   Team Members Present Physician;Nurse   Physician Team Member [id number] Team Member Ebonie Welch   Patient/Family Present   Patient Present No   Patient's Family Present No   Medical, labs and medications reviewed by team  Discharge to be determined by attending  Medication changes to be assessed

## 2019-11-10 NOTE — PROGRESS NOTES
11/10/19 1100   Team Meeting   Meeting Type Daily Rounds   Initial Conference Date 11/10/19   Team Members Present   Team Members Present Physician;Nurse   Physician Team Member [id number] Team Member Navya Patel   Patient/Family Present   Patient Present No   Patient's Family Present No   Medical, labs and medications reviewed by team  Discharge to be determined by attending  Medication changes to be assessed

## 2019-11-11 PROCEDURE — 99232 SBSQ HOSP IP/OBS MODERATE 35: CPT | Performed by: NURSE PRACTITIONER

## 2019-11-11 RX ORDER — CLONIDINE HYDROCHLORIDE 0.1 MG/1
0.05 TABLET ORAL 2 TIMES DAILY
Status: DISCONTINUED | OUTPATIENT
Start: 2019-11-11 | End: 2019-11-13 | Stop reason: HOSPADM

## 2019-11-11 RX ADMIN — CLONIDINE HYDROCHLORIDE 0.05 MG: 0.1 TABLET ORAL at 08:27

## 2019-11-11 RX ADMIN — BUPRENORPHINE HYDROCHLORIDE, NALOXONE HYDROCHLORIDE 2 FILM: 2; .5 FILM, SOLUBLE BUCCAL; SUBLINGUAL at 08:28

## 2019-11-11 RX ADMIN — MIRTAZAPINE 15 MG: 15 TABLET, FILM COATED ORAL at 21:05

## 2019-11-11 NOTE — PROGRESS NOTES
11/11/19 0828   Team Meeting   Meeting Type Daily Rounds   Initial Conference Date 11/11/19   Team Members Present   Team Members Present Physician;Nurse;   Physician Team Member Dr Sonam Carr Team Member [facility] Team Member Micaela Zaragoza   Patient/Family Present   Patient Present No   Patient's Family Present No   Medications to be monitored and adjusted  Requesting inpatient rehab upon discharge

## 2019-11-11 NOTE — PROGRESS NOTES
Progress Note - Behavioral Health   Vivi Alamo 22 y o  male MRN: [id number]  Unit/Bed#: [facility] 382-01 Encounter: [id number]    The patient was seen for continuing care and reviewed with treatment team  Pt Wolof speaking and visible on unit  Language line used during interview  Indicates mood is stable and voices have resolved since patient stopped drug use  States voices telling him to kill self where only associated with heroin use during the 3 day period prior to hospitalization  Resolution of auditory hallucination spontaneous, not requiring pharmacological treatment with antipsychotic medication  No delusional material evident  Sleep and appetite good  Requests IOP D&A treatment on discharge  Reports back pain 4/10, advised to use PRN's for pain  Denies medication side effects  Mental Status Evaluation:  Appearance:  Adequate hygiene and grooming   Behavior:  calm, cooperative and friendly   Mood:  euthymic   Affect: appropriate and mood-congruent   Speech: Normal rate and Normal volume   Thought Process:  Goal directed and coherent   Thought Content:  Does not verbalize delusional material   Perceptual Disturbances: Denies hallucinations and does not appear to be responding to internal stimuli   Risk Potential: No suicidal or homicidal ideation   Orientation:   AOX4     Progress Toward Goals: Mood has stabilized, withdrawal symptoms well managed, denies auditory hallucinations  Recommended Treatment: Continue with pharmacotherapy, group therapy, milieu therapy and occupational therapy    The patient will be maintained on the following medications:    Current Facility-Administered Medications:  aluminum-magnesium hydroxide-simethicone 30 mL Oral Q4H PRN Vi Cosme MD   benztropine 1 mg Intramuscular Q6H PRN Vi Cosme MD   benztropine 1 mg Oral Q6H PRN Vi Cosme MD   buprenorphine-naloxone 1 Film Sublingual Q4H PRN Vi Cosme MD   buprenorphine-naloxone 2 Film Sublingual Daily Saurav Kraus MD   cloNIDine 0 05 mg Oral 4x Daily Saurav Kraus MD   cloNIDine 0 1 mg Oral Q6H PRN Saurav Kraus MD   dicyclomine 10 mg Oral Q6H PRN Saurav Kraus MD   haloperidol 5 mg Oral Q8H PRN Saurav Kraus MD   haloperidol lactate 5 mg Intramuscular Q6H PRN Saurav Kraus MD   hydrOXYzine HCL 25 mg Oral Q6H PRN Saurav Kraus MD   ibuprofen 400 mg Oral Q8H PRN Saurav Kraus MD   ibuprofen 600 mg Oral Q8H PRN Saurav Kraus MD   ibuprofen 800 mg Oral Q8H PRN Saurav Kraus MD   influenza vaccine 0 5 mL Intramuscular Prior to discharge Saurav Kraus MD   loperamide 2 mg Oral Q4H PRN Saurav Kraus MD   LORazepam 1 mg Intramuscular Q6H PRN Saurav Kraus MD   LORazepam 1 mg Oral Q8H PRN Saurav Kraus MD   magnesium hydroxide 30 mL Oral Daily PRN Saurav Kraus MD   methocarbamol 500 mg Oral Q6H PRN Saurav Kraus MD   mirtazapine 15 mg Oral HS Saurav Kraus MD   nicotine polacrilex 2 mg Oral Q2H PRN Saurav Kraus MD   OLANZapine 10 mg Intramuscular Q3H PRN Saurav Kraus MD   OLANZapine 5 mg Oral Q3H PRN Saurav Kraus MD   ondansetron 4 mg Oral Q6H PRN Saurav Kraus MD   risperiDONE 1 mg Oral Q3H PRN Saurav Kraus MD   traZODone 50 mg Oral HS PRN Saurav Kraus MD       Severe episode of recurrent major depressive disorder, without psychotic features ([facility] [address] )

## 2019-11-11 NOTE — PROGRESS NOTES
Quick note:  Patient reported to have transaminitis present on admission which is slowly resolving  There is no noted alcohol abuse history however he does have history of using drugs  Hepatitis panel is negative  Liver ultrasound ordered    Repeat CMP recommended in 4 weeks to be followed up outpatient by PCP

## 2019-11-12 ENCOUNTER — APPOINTMENT (INPATIENT)
Dept: ULTRASOUND IMAGING | Facility: HOSPITAL | Age: 24
DRG: 751 | End: 2019-11-12
Payer: COMMERCIAL

## 2019-11-12 PROCEDURE — 76705 ECHO EXAM OF ABDOMEN: CPT

## 2019-11-12 PROCEDURE — 99232 SBSQ HOSP IP/OBS MODERATE 35: CPT | Performed by: NURSE PRACTITIONER

## 2019-11-12 RX ADMIN — NICOTINE POLACRILEX 2 MG: 2 GUM, CHEWING ORAL at 14:44

## 2019-11-12 RX ADMIN — CLONIDINE HYDROCHLORIDE 0.05 MG: 0.1 TABLET ORAL at 17:49

## 2019-11-12 RX ADMIN — MIRTAZAPINE 15 MG: 15 TABLET, FILM COATED ORAL at 21:09

## 2019-11-12 RX ADMIN — CLONIDINE HYDROCHLORIDE 0.05 MG: 0.1 TABLET ORAL at 09:20

## 2019-11-12 RX ADMIN — BUPRENORPHINE HYDROCHLORIDE, NALOXONE HYDROCHLORIDE 2 FILM: 2; .5 FILM, SOLUBLE BUCCAL; SUBLINGUAL at 09:20

## 2019-11-12 NOTE — PROGRESS NOTES
Patient was referred for a co-occurring assessment by Dr Basilio Canas  We used the  on a stick; Yg was our  #[id number]  Patient states he was raised by his grandfather since his father was in the [address] and his mother was  to a man in [facility]  He has 2 siblings  His grandfather  a year ago and he took his death very hard  He only completed the 10th grade  He currently is unemployed;  with a 3year old  They are  and he is  living with his mother's family  He is verbalizing the need to get a job  His support system is his wife's family and his mother's family  Legally he has a DUI which he is unsure that he has a   The chart states he has a court hearing on 19 for robbery and his DUI hearing is 19  He minimizes this  He denies any trauma or abuse  He down plays the abandonment of his parents  He saw his grandfather as a father and his death was very difficult  He has never been in the [employer]  He states he believes in God  The following is what he states he uses:  Alcohol he is vague but may drink once a month to every other month  He could not states what he drinks or the last time  He denies marijuana use ever  Cocaine he has been using since he was 23 once to every other month; he did not know the amounts  He last used 15 days ago;  $40   Heroin he states he has been using for only 7 months and is using a bundle a day  Last use was 2019  Patient minimizes his use and denies any consequences  His legal situation does not appear to impact him; other than he states "I came he for help"  He denies his use impacted his relationship with his wife and he was not free to offer why they are   It appears that he has value conflicts spending money on drugs and not taking care of his family  He still is grieving the death of his grandfather   This writer believes he has other traumatic events in his life that he is not identifying at this time  He is ambivalent about what he is willing to do  He was offered inpatient treatment but denied  He agreed to IOP at [facility] AT THE [facility] and if he continues to use he will then go inpatient treatment  Therapist will continue to offer support

## 2019-11-12 NOTE — PROGRESS NOTES
11/12/19 0828   Team Meeting   Meeting Type Daily Rounds   Initial Conference Date 11/12/19   Team Members Present   Team Members Present Physician;Nurse;   Physician Team Member Dr Luis Alberto Bay Team Member [facility] Team Member Yamile Root   Patient/Family Present   Patient Present No   Patient's Family Present No   ALT and AST improving but increased  Possible discharge for tomorrow

## 2019-11-12 NOTE — PLAN OF CARE
Worker contacted [facility] to determine a fax number to fax hospitalization letter as pt reported he has a hearing tomorrow  Worker was informed that does not have any upcoming hearing until 11/22 at 9:30AM with Carlita Sullivan for a DUI and a hearing on 11/26 at 1:15PM in front of the   No hearing was shown for 11/13

## 2019-11-12 NOTE — PROGRESS NOTES
Progress Note - Behavioral Health   Hema Sims 22 y o  male MRN: [id number]  Unit/Bed#: [facility] 382-01 Encounter: [id number]    The patient was seen for continuing care and reviewed with treatment team  Staff reports that the patient remains visible on the unit, calm, cooperative, and compliant with medications  During assessment the language line was used and patient was appropriate during conversation  Denies any current feelings of depression, but rates anxiety a 2 out of 10  Continues to report minimal withdrawal symptoms  Denies any thoughts to hurt himself or others  Denies any psychotic symptoms  Reports motivation to attend outpatient rehab  Collaborated care with  to ensure safe discharge plan  Ultrasound of gallbladder was normal  Will continue to titrate Suboxone to 6 mg po daily for symptom management  Patient is scheduled for discharge tomorrow   Liver enzymes are slowly improving    Mental Status Evaluation:  Appearance:  Adequate hygiene and grooming   Behavior:  cooperative   Fund of knowledge  aware of current events and Aware of past history   Speech:   Language: Normal rate and Normal volume  No overt abnormality   Mood:  anxious   Affect:   Associations: blunted  Intact   Thought Process:  linear   Thought Content:  Does not verbalize delusional material   Perceptual Disturbances: Denies hallucinations and does not appear to be responding to internal stimuli   Risk Potential: No suicidal or homicidal ideation   Orientation  Oriented x 3   Memory Not tested   Attention/Concentration attention span appeared shorter than expected for age   Insight:  limited   Judgment: Limited   Gait/Station: normal gait/station and normal balance   Motor Activity: No abnormal movement noted     Progress Toward Goals: improving    Assessment/Plan    Principal Problem:    Severe episode of recurrent major depressive disorder, without psychotic features [facility])  Active Problems:    Medical clearance for psychiatric admission    Prolonged Q-T interval on ECG    Elevated liver enzymes      Recommended Treatment: Continue with pharmacotherapy, group therapy, milieu therapy and occupational therapy    The patient will be maintained on the following medications:    Current Facility-Administered Medications:  aluminum-magnesium hydroxide-simethicone 30 mL Oral Q4H PRN Vi Cosme MD   benztropine 1 mg Intramuscular Q6H PRN Vi Cosme MD   benztropine 1 mg Oral Q6H PRN Vi Cosme MD   buprenorphine-naloxone 1 Film Sublingual Q4H PRN Vi Cosme MD   buprenorphine-naloxone 2 Film Sublingual Daily ASHISH Henao   cloNIDine 0 05 mg Oral BID Vi Cosme MD   cloNIDine 0 1 mg Oral Q6H PRN Vi Cosme MD   dicyclomine 10 mg Oral Q6H PRN Vi Cosme MD   haloperidol 5 mg Oral Q8H PRN Vi Cosme MD   haloperidol lactate 5 mg Intramuscular Q6H PRN Vi Cosme MD   hydrOXYzine HCL 25 mg Oral Q6H PRN Vi Cosme MD   ibuprofen 400 mg Oral Q8H PRN Vi Cosme MD   ibuprofen 600 mg Oral Q8H PRN Vi Cosme MD   ibuprofen 800 mg Oral Q8H PRN Vi Cosme MD   influenza vaccine 0 5 mL Intramuscular Prior to discharge Vi Cosme MD   loperamide 2 mg Oral Q4H PRN Vi Cosme MD   LORazepam 1 mg Intramuscular Q6H PRN Vi Cosme MD   LORazepam 1 mg Oral Q8H PRN Vi Cosme MD   magnesium hydroxide 30 mL Oral Daily PRN Vi Cosme MD   methocarbamol 500 mg Oral Q6H PRN Vi Cosme MD   mirtazapine 15 mg Oral HS Vi Cosme MD   nicotine polacrilex 2 mg Oral Q2H PRN Vi Cosme MD   OLANZapine 10 mg Intramuscular Q3H PRN Vi Cosme MD   OLANZapine 5 mg Oral Q3H PRDAV Cosme MD   ondansetron 4 mg Oral Q6H PRN Vi Cosme MD   risperiDONE 1 mg Oral Q3H PRN Vi Cosme MD   traZODone 50 mg Oral HS PRN Vi Cosme MD       Risks, benefits and possible side effects of Medications:   Risks, benefits, and possible side effects of medications explained to patient and patient verbalizes understanding

## 2019-11-12 NOTE — PLAN OF CARE
Pt given information on [facility] Office to access services until MA becomes active      Worker scheduled intake at [facility] AT THE [facility] for D/A services on 11/14 at [place] [address]

## 2019-11-12 NOTE — PROGRESS NOTES
Pt with bright affect and pleasant mood  Pt denies Si's, HI's, AH's, VH's  Pt complied with US liver this morning  Returned to unit and was med/meal compliant  Pt social with peers  Will continue to monitor

## 2019-11-12 NOTE — SOCIAL WORK
Worker attempted to call pt's mother to notify of discharge tomorrow  Pt provided worker will an incorrect telephone number for mother

## 2019-11-12 NOTE — NURSING NOTE
Patient was cooperative and social with Malay-speaking peers during the evening  Patient denied anxiety, depression, SI/HI, AH/VH, and pain  Patient was compliant with medication  Patient currently appears to be sleeping  Will continue to monitor closely

## 2019-11-13 VITALS
RESPIRATION RATE: 16 BRPM | HEIGHT: 68 IN | SYSTOLIC BLOOD PRESSURE: 97 MMHG | TEMPERATURE: 98.3 F | HEART RATE: 74 BPM | WEIGHT: 139 LBS | DIASTOLIC BLOOD PRESSURE: 62 MMHG | OXYGEN SATURATION: 98 % | BODY MASS INDEX: 21.07 KG/M2

## 2019-11-13 PROCEDURE — 99239 HOSP IP/OBS DSCHRG MGMT >30: CPT | Performed by: PSYCHIATRY & NEUROLOGY

## 2019-11-13 PROCEDURE — 90686 IIV4 VACC NO PRSV 0.5 ML IM: CPT | Performed by: PSYCHIATRY & NEUROLOGY

## 2019-11-13 PROCEDURE — 90471 IMMUNIZATION ADMIN: CPT | Performed by: PSYCHIATRY & NEUROLOGY

## 2019-11-13 RX ORDER — BUPRENORPHINE AND NALOXONE 2; .5 MG/1; MG/1
2 FILM, SOLUBLE BUCCAL; SUBLINGUAL DAILY
Qty: 20 FILM | Refills: 0 | Status: SHIPPED | OUTPATIENT
Start: 2019-11-13 | End: 2019-11-13

## 2019-11-13 RX ORDER — BUPRENORPHINE AND NALOXONE 2; .5 MG/1; MG/1
2 FILM, SOLUBLE BUCCAL; SUBLINGUAL DAILY
Qty: 20 FILM | Refills: 0 | Status: SHIPPED | OUTPATIENT
Start: 2019-11-13 | End: 2019-11-20

## 2019-11-13 RX ORDER — MIRTAZAPINE 15 MG/1
15 TABLET, FILM COATED ORAL
Qty: 30 TABLET | Refills: 0 | Status: SHIPPED | OUTPATIENT
Start: 2019-11-13 | End: 2019-12-13

## 2019-11-13 RX ORDER — CLONIDINE HYDROCHLORIDE 0.1 MG/1
0.05 TABLET ORAL 2 TIMES DAILY
Qty: 15 TABLET | Refills: 0 | Status: SHIPPED | OUTPATIENT
Start: 2019-11-13 | End: 2019-11-28

## 2019-11-13 RX ORDER — NALOXONE HYDROCHLORIDE 4 MG/.1ML
1 SPRAY NASAL AS NEEDED
Qty: 1 EACH | Refills: 0 | Status: SHIPPED | OUTPATIENT
Start: 2019-11-13 | End: 2019-11-28

## 2019-11-13 RX ADMIN — CLONIDINE HYDROCHLORIDE 0.05 MG: 0.1 TABLET ORAL at 08:41

## 2019-11-13 RX ADMIN — INFLUENZA VIRUS VACCINE 0.5 ML: 15; 15; 15; 15 SUSPENSION INTRAMUSCULAR at 11:54

## 2019-11-13 RX ADMIN — BUPRENORPHINE HYDROCHLORIDE, NALOXONE HYDROCHLORIDE 2 FILM: 2; .5 FILM, SOLUBLE BUCCAL; SUBLINGUAL at 08:41

## 2019-11-13 NOTE — PROGRESS NOTES
Pt denies SI's, HI's, AH's, VH's  Pt pleasant, cooperative, social with peers  Will continue to monitor and prepare for d/c today

## 2019-11-13 NOTE — PLAN OF CARE
Pt to be discharged  Pt denies SI/HI, AH/VH  Pt oriented x3  Pt to return home and reports he will walk  Pt to follow up with [facility] office until MA becomes active  Pt to follow up with [facility] D/A intake on 11/14 at [address] [place] [address] filled at [facility]

## 2019-11-13 NOTE — DISCHARGE SUMMARY
Discharge Summary - [facility] 22 y o  male MRN: [id number]  Unit/Bed#: [facility] 382-01 Encounter: [id number]     Admission Date: 11/6/2019         Discharge Date: 11/13/2019  Attending Psychiatrist: Jennie Villalpando MD    Reason for Admission/HPI:     Juancho Rodriguez is a 22year old male with a history of depression, anxiety, and unstable mood  Symptoms prior to admission included worsening depression, anxiety, hopelessness, helplessness, poor sleep, lack of motivation, loss of energy, and suicidal ideation  The patient reports that the onset of his symptoms started gradually several months ago and have been worsening over time  Stressors preceding admission included chronic substance use and the recent death of a grandfather  On initial evaluation the patient was depressed, fatigued, hopeless, and presented with symptoms of opiate withdrawal      Meds/Allergies     all current active meds have been reviewed    No Known Allergies    Objective     Vital signs in last 24 hours:  Temp:  [97 7 °F (36 5 °C)-98 1 °F (36 7 °C)] 97 7 °F (36 5 °C)  HR:  [58-90] 58  Resp:  [16] 16  BP: (108-110)/(67-76) 108/67    No intake or output data in the 24 hours ending 11/13/19 [address] [place] [address] Course: The patient was admitted to the inpatient psychiatric unit and started on every 15 minutes precautions  During the hospitalization the patient was attending individual therapy, group therapy, milieu therapy and occupational therapy  Psychiatric medications were titrated over the hospital stay  To address depressive symptoms, mood instability, irritability, anxiety symptoms and insomnia the patient was started on antidepressant Remeron and medications to control opioid withdrawal Clonidine and Suboxone  Medication doses were titrated during the hospital course   Prior to beginning of treatment medications risks and benefits and possible side effects including risk of suicidality and serotonin syndrome related to treatment with antidepressants were reviewed with the patient  The patient verbalized understanding and agreement for treatment  Patient's symptoms improved gradually over the hospital course  At the end of treatment the patient was doing well  Mood was stable at the time of discharge  The patient denied suicidal ideation, intent or plan at the time of discharge and denied homicidal ideation, intent or plan at the time of discharge  There was no overt psychosis at the time of discharge  Sleep and appetite were improved  The patient was tolerating medications and was not reporting any significant side effects at the time of discharge  Since the patient was doing well at the end of the hospitalization, treatment team felt that the patient could be safely discharged to outpatient care  The outpatient follow up with a psychiatrist and a therapist was arranged by the unit  upon discharge  Intake was set up for [facility] for D/A services      Mental Status at Time of Discharge:   Appearance:  Adequate hygiene and grooming   Behavior:  cooperative   Speech:   Language: Normal rate and Normal volume  No overt abnormality   Mood:  improving   Affect:   Associations: blunted  intact   Thought Process:  Goal directed and coherent   Thought Content:  Does not verbalize delusional material   Perceptual Disturbances: Denies hallucinations and does not appear to be responding to internal stimuli     Risk Potential: No suicidal or homicidal ideation   Orientation   Language Oriented x 3  anomia No   Memory  Fund of knowledge Not tested  aware of current events and Aware of past history   Attention/Concentration attention span appeared shorter than expected for age   Insight:  limited   Judgment: Limited   Gait/Station: normal gait/station and normal balance   Motor Activity: No abnormal movement noted       Admission Diagnosis:  Principal Problem:    Severe episode of recurrent major depressive disorder, without psychotic features ([facility] )  Active Problems:    Medical clearance for psychiatric admission    Prolonged Q-T interval on ECG    Elevated liver enzymes      Discharge Diagnosis:     Principal Problem:    Severe episode of recurrent major depressive disorder, without psychotic features ([facility] )  Active Problems:    Medical clearance for psychiatric admission    Prolonged Q-T interval on ECG    Elevated liver enzymes  Resolved Problems:    * No resolved hospital problems   *      Lab results:    Admission on 11/06/2019   Component Date Value    Amph/Meth UR 11/06/2019 Negative     Barbiturate Ur 11/06/2019 Negative     Benzodiazepine Urine 11/06/2019 Negative     Cocaine Urine 11/06/2019 Negative     Methadone Urine 11/06/2019 Negative     Opiate Urine 11/06/2019 Positive*    PCP Ur 11/06/2019 Negative     THC Urine 11/06/2019 Negative     EXTBreath Alcohol 11/06/2019 0 000     WBC 11/06/2019 11 60*    RBC 11/06/2019 4 92     Hemoglobin 11/06/2019 13 9     Hematocrit 11/06/2019 40 7*    MCV 11/06/2019 83     MCH 11/06/2019 28 4     MCHC 11/06/2019 34 2     RDW 11/06/2019 13 8     MPV 11/06/2019 7 4*    Platelets 44/27/9581 350     Neutrophils Relative 11/06/2019 76*    Lymphocytes Relative 11/06/2019 16*    Monocytes Relative 11/06/2019 7     Eosinophils Relative 11/06/2019 1     Basophils Relative 11/06/2019 1     Neutrophils Absolute 11/06/2019 8 80*    Lymphocytes Absolute 11/06/2019 1 80     Monocytes Absolute 11/06/2019 0 80     Eosinophils Absolute 11/06/2019 0 10     Basophils Absolute 11/06/2019 0 10     Sodium 11/06/2019 141     Potassium 11/06/2019 4 2     Chloride 11/06/2019 102     CO2 11/06/2019 33*    ANION GAP 11/06/2019 6     BUN 11/06/2019 12     Creatinine 11/06/2019 0 91     Glucose 11/06/2019 94     Calcium 11/06/2019 9 5     AST 11/06/2019 120*    ALT 11/06/2019 351*    Alkaline Phosphatase 11/06/2019 45     Total Protein 11/06/2019 7 5     Albumin 11/06/2019 4 5     Total Bilirubin 11/06/2019 0 80     eGFR 11/06/2019 118     Acetaminophen Level 34/03/6943 <00*    Salicylate Lvl 20/93/9745 <1 0*    Ventricular Rate 11/06/2019 68     Atrial Rate 11/06/2019 68     NV Interval 11/06/2019 166     QRSD Interval 11/06/2019 104     QT Interval 11/06/2019 400     QTC Interval 11/06/2019 425     P Axis 11/06/2019 71     QRS Axis 11/06/2019 48     T Wave Brumley 11/06/2019 48     TSH 3RD GENERATON 11/06/2019 3 670     Cholesterol 11/06/2019 155     Triglycerides 11/06/2019 72     HDL, Direct 11/06/2019 29*    LDL Calculated 11/06/2019 112     Non-HDL-Chol (CHOL-HDL) 11/06/2019 126     RPR 11/06/2019 Non-Reactive     Color, UA 11/07/2019 Straw     Clarity, UA 11/07/2019 Clear     Specific Gravity, UA 11/07/2019 1 010     pH, UA 11/07/2019 6 0     Leukocytes, UA 11/07/2019 100 0*    Nitrite, UA 11/07/2019 Negative     Protein, UA 11/07/2019 Negative     Glucose, UA 11/07/2019 Negative     Ketones, UA 11/07/2019 Negative     Bilirubin, UA 11/07/2019 Negative     Blood, UA 11/07/2019 10 0*    UROBILINOGEN UA 11/07/2019 Negative     Magnesium 11/06/2019 2 0     RBC, UA 11/07/2019 1-2*    WBC, UA 11/07/2019 2-4*    Epithelial Cells 11/07/2019 None Seen     Bacteria, UA 11/07/2019 None Seen     Sodium 11/08/2019 140     Potassium 11/08/2019 4 1     Chloride 11/08/2019 102     CO2 11/08/2019 29     ANION GAP 11/08/2019 9     BUN 11/08/2019 12     Creatinine 11/08/2019 0 77     Glucose 11/08/2019 101*    Glucose, Fasting 11/08/2019 101*    Calcium 11/08/2019 9 2     AST 11/08/2019 71*    ALT 11/08/2019 244*    Alkaline Phosphatase 11/08/2019 39*    Total Protein 11/08/2019 7 2     Albumin 11/08/2019 4 1     Total Bilirubin 11/08/2019 0 60     eGFR 11/08/2019 128     WBC 11/08/2019 10 70     RBC 11/08/2019 5 03     Hemoglobin 11/08/2019 14 3     Hematocrit 11/08/2019 42 1     MCV 11/08/2019 84     [facility] 11/08/2019 28 4     MCHC 2019 34 0     RDW 2019 13 5     MPV 2019 8 1*    Platelets  359     Neutrophils Relative 2019 68*    Lymphocytes Relative 2019 22*    Monocytes Relative 2019 8     Eosinophils Relative 2019 2     Basophils Relative 2019 1     Neutrophils Absolute 2019 7 30     Lymphocytes Absolute 2019 2 30     Monocytes Absolute 2019 0 80     Eosinophils Absolute 2019 0 20     Basophils Absolute 2019 0 10     Hepatitis B Surface Ag 2019 Non-reactive     Hep A IgM 2019 Non-reactive     Hepatitis C Ab 2019 Non-reactive     Hep B C IgM 2019 Non-reactive     Sodium 11/10/2019 140     Potassium 11/10/2019 4 5     Chloride 11/10/2019 102     CO2 11/10/2019 34*    ANION GAP 11/10/2019 4*    BUN 11/10/2019 9     Creatinine 11/10/2019 0 97     Glucose 11/10/2019 91     Calcium 11/10/2019 9 3     AST 11/10/2019 83*    ALT 11/10/2019 240*    Alkaline Phosphatase 11/10/2019 44     Total Protein 11/10/2019 6 9     Albumin 11/10/2019 3 9     Total Bilirubin 11/10/2019 0 50     eGFR 11/10/2019 110        Discharge Medications:    See after visit summary for reconciled discharge medications provided to patient and family  Discharge instructions/Information to patient and family:     See after visit summary for information provided to patient and family  Provisions for Follow-Up Care:    See after visit summary for information related to follow-up care and any pertinent home health orders  Discharge Statement     I spent 35 minutes discharging the patient  This time was spent on the day of discharge  I had direct contact with the patient on the day of discharge  Additional documentation is required if more than 30 minutes were spent on discharge:    I reviewed with Stewart Apgar importance of compliance with medications and outpatient treatment after discharge    I discussed the medication regimen and possible side effects of the medications with Stefan prior to discharge  At the time of discharge he was tolerating psychiatric medications  I discussed outpatient follow up with Ricki Pineda  I reviewed with Stefan crisis plan and safety plan upon discharge  I discussed with Ricki Pineda recommendation to follow up with outpatient drug and alcohol counseling and AA meetings  Ricki Pineda agreed to abstain from drug and alcohol use after discharge  Ricki Pineda was competent to understand risks and benefits of withholding information and risks and benefits of his actions

## 2019-11-13 NOTE — PROGRESS NOTES
Pt has been pleasant and cooperative, visible/social  Pt is med/meal compliant  Pt denies any anxiety, depression, SI, HI, AH, or VH  Will continue to monitor

## 2019-11-13 NOTE — PROGRESS NOTES
11/13/19 0820   Team Meeting   Meeting Type Daily Rounds   Initial Conference Date 11/13/19   Team Members Present   Team Members Present Physician;Nurse;   Physician Team Member Dr Valerio Parks Team Member [address],[facility] Management Team Member Rashmi Oliveros   Patient/Family Present   Patient Present No   Patient's Family Present No   Patient to be discharged today

## 2019-11-13 NOTE — NURSING NOTE
Pt was successfully discharged at approximately 1600  Discharge instructions were reviewed with pt with a  present  Pt was discharged with 20 films of Suboxone, Clonidine and Remeron  Pt walked home

## 2019-11-13 NOTE — PLAN OF CARE
Problem: SELF HARM/SUICIDALITY  Goal: Will have no self-injury during hospital stay  Description  INTERVENTIONS:  - Q 15 MINUTES: Routine safety checks  - Q WAKING SHIFT & PRN: Assess risk to determine if routine checks are adequate to maintain patient safety  - Encourage patient to participate actively in care by formulating a plan to combat response to suicidal ideation, identify supports and resources  Outcome: Completed     Problem: DEPRESSION  Goal: Will be euthymic at discharge  Description  INTERVENTIONS:  - Administer medication as ordered  - Provide emotional support via 1:1 interaction with staff  - Encourage involvement in milieu/groups/activities  - Monitor for social isolation  Outcome: Completed     Problem: SUBSTANCE USE/ABUSE  Goal: Will have no detox symptoms and will verbalize plan for changing substance-related behavior  Description  INTERVENTIONS:  - Monitor physical status and assess for symptoms of withdrawal  - Administer medication as ordered  - Provide emotional support with 1 on 1 interaction with staff  - Encourage recovery focused program/ addiction education  - Assess for verbalization of changing behaviors related to substance abuse  - Initiate consults and referrals as appropriate (Case Management, Spiritual Care, etc )  Outcome: Completed  Goal: By discharge, will develop insight into their chemical dependency and sustain motivation to continue in recovery  Description  INTERVENTIONS:  - Attends all daily group sessions and scheduled AA groups  - Actively practices coping skills through participation in the therapeutic community and adherence to program rules  - Reviews and completes assignments from individual treatment plan  - Assist patient development of understanding of their personal cycle of addiction and relapse triggers  Outcome: Completed  Goal: By discharge, patient will have ongoing treatment plan addressing chemical dependency  Description  INTERVENTIONS:  - Assist patient with resources and/or appointments for ongoing recovery based living  Outcome: Completed     Problem: Ineffective Coping  Goal: Participates in unit activities  Description  Interventions:  - Provide therapeutic environment   - Provide required programming   - Redirect inappropriate behaviors   Outcome: Completed     Problem: DISCHARGE PLANNING  Goal: Discharge to home or other facility with appropriate resources  Description  INTERVENTIONS:  - Identify barriers to discharge w/patient and caregiver  - Arrange for needed discharge resources and transportation as appropriate  - Identify discharge learning needs (meds, wound care, etc )  - Arrange for interpretive services to assist at discharge as needed  - Refer to Case Management Department for coordinating discharge planning if the patient needs post-hospital services based on physician/advanced practitioner order or complex needs related to functional status, cognitive ability, or social support system  Outcome: Completed

## 2019-11-14 NOTE — PROGRESS NOTES
Writer educated patient on discharge instructions which included medication dose schedule with downward taper of suboxone  Writer handwrote on discharge instructions "Call for info regarding Narcan: [facility] [address] , [place], [address]" for possible financial assistance to fill Narcan prescription  Awaiting delivery of medications for d/c to home

## 2024-06-04 NOTE — ED NOTES
Patient comes in with chest pain that has been going on since he had a heart stent placed here on May 6th. He states when he wakes up in the morning, the pain is worse and very tight and through out the day, the pain goes away. He is scheduled to get a heart monitor next week and see EP.    Pt sleeping comfortably     Rae Aceves RN  11/06/19 1916